# Patient Record
Sex: FEMALE | Race: WHITE | NOT HISPANIC OR LATINO | Employment: OTHER | URBAN - NONMETROPOLITAN AREA
[De-identification: names, ages, dates, MRNs, and addresses within clinical notes are randomized per-mention and may not be internally consistent; named-entity substitution may affect disease eponyms.]

---

## 2017-01-12 RX ORDER — POTASSIUM CHLORIDE 750 MG/1
TABLET, FILM COATED, EXTENDED RELEASE ORAL
Qty: 30 TABLET | Refills: 3 | Status: SHIPPED | OUTPATIENT
Start: 2017-01-12 | End: 2017-05-12 | Stop reason: SDUPTHER

## 2017-01-30 ENCOUNTER — OFFICE VISIT (OUTPATIENT)
Dept: CARDIOLOGY | Facility: CLINIC | Age: 82
End: 2017-01-30

## 2017-01-30 VITALS
HEART RATE: 64 BPM | DIASTOLIC BLOOD PRESSURE: 64 MMHG | SYSTOLIC BLOOD PRESSURE: 134 MMHG | WEIGHT: 122 LBS | BODY MASS INDEX: 22.45 KG/M2 | HEIGHT: 62 IN

## 2017-01-30 DIAGNOSIS — R01.1 MURMUR: ICD-10-CM

## 2017-01-30 DIAGNOSIS — I10 ESSENTIAL HYPERTENSION: Primary | ICD-10-CM

## 2017-01-30 DIAGNOSIS — E78.49 OTHER HYPERLIPIDEMIA: ICD-10-CM

## 2017-01-30 DIAGNOSIS — R06.02 SHORTNESS OF BREATH: ICD-10-CM

## 2017-01-30 DIAGNOSIS — I65.21 STENOSIS OF RIGHT CAROTID ARTERY: ICD-10-CM

## 2017-01-30 DIAGNOSIS — Z51.81 MEDICATION MONITORING ENCOUNTER: ICD-10-CM

## 2017-01-30 PROBLEM — E78.5 HYPERLIPEMIA: Status: ACTIVE | Noted: 2017-01-30

## 2017-01-30 PROBLEM — I65.29 CAROTID STENOSIS: Status: ACTIVE | Noted: 2017-01-30

## 2017-01-30 PROBLEM — R42 DIZZINESS: Status: ACTIVE | Noted: 2017-01-30

## 2017-01-30 PROCEDURE — 99214 OFFICE O/P EST MOD 30 MIN: CPT | Performed by: NURSE PRACTITIONER

## 2017-01-30 RX ORDER — NIFEDIPINE 30 MG/1
30 TABLET, EXTENDED RELEASE ORAL DAILY
COMMUNITY
End: 2021-02-22 | Stop reason: SDUPTHER

## 2017-01-30 NOTE — LETTER
January 30, 2017     Navarro Grace MD  [address] [place] [id number]    Patient: Kylah Pena   YOB: 1933   Date of Visit: 1/30/2017       Dear Dr. Lesly MD:    Kylah Pena was in my office today. Below are the relevant portions of my assessment and plan of care.        HR and BP are stable.  In regards to carotid stenosis, Dr. Phillips is monitoring and advised on conservative management as CTA of carotids could not be done secondary to increased creatinine and most recent carotid US did not show significantly worse narrowing.  He did advise to her to follow with neurology in regards to syncopal episodes and did advise her to discuss with you.  She will continue to follow with Dr. Phillips as well for PVD management but states he said nothing could be done for her PVD.  We did discuss to continue plavix therapy and medication management for lipid management.  Echo advised to assess for any valvular concerns or pericardial effusion that may be causing shortness of breath.  More recommendations to follow.  Labs and refills she reports with you.  6 month follow up advised or sooner if needed.      Problems Addressed this Visit        Cardiovascular and Mediastinum    HTN (hypertension) - Primary    Relevant Medications    NIFEdipine XL (PROCARDIA XL) 30 MG 24 hr tablet    Other Relevant Orders    Adult Transthoracic Echo Complete    Carotid stenosis    Relevant Orders    Adult Transthoracic Echo Complete    Hyperlipemia    Relevant Orders    Adult Transthoracic Echo Complete      Other Visit Diagnoses     Shortness of breath        Murmur        Relevant Orders    Adult Transthoracic Echo Complete              Electronically signed by EVELYN Cain January 30, 2017 11:03 AM              If you have questions, please do not hesitate to call me. I look forward to following Kylah along with you.         Sincerely,        EVELYN Cain        CC: No Recipients

## 2017-01-30 NOTE — MR AVS SNAPSHOT
Kylah Pena   1/30/2017 8:45 AM   Office Visit    Dept Phone:  [phone]   Encounter #:  [id number]    Provider:  EVELYN Cobb   Department:  [facility] CARDIOLOGY                Your Full Care Plan              Today's Medication Changes          These changes are accurate as of: 1/30/17  9:06 AM.  If you have any questions, ask your nurse or doctor.               Medication(s)that have changed:     NIFEdipine XL 30 MG 24 hr tablet   Commonly known as:  PROCARDIA XL   Take 30 mg by mouth Daily.   What changed:  Another medication with the same name was removed. Continue taking this medication, and follow the directions you see here.   Changed by:  EVELYN Cobb         Stop taking medication(s)listed here:     potassium chloride 10 MEQ CR tablet   Commonly known as:  K-DUR,KLOR-CON   Stopped by:  EVELYN Cobb                      Your Updated Medication List          This list is accurate as of: 1/30/17  9:06 AM.  Always use your most recent med list.                ALPRAZolam 0.5 MG tablet   Commonly known as:  XANAX       aspirin 81 MG EC tablet       carvedilol 3.125 MG tablet   Commonly known as:  COREG       clopidogrel 75 MG tablet   Commonly known as:  PLAVIX       furosemide 40 MG tablet   Commonly known as:  LASIX       gabapentin 300 MG capsule   Commonly known as:  NEURONTIN       hydroxychloroquine 200 MG tablet   Commonly known as:  PLAQUENIL       losartan 100 MG tablet   Commonly known as:  COZAAR   1/2 tablet by mouth daily       NIFEdipine XL 30 MG 24 hr tablet   Commonly known as:  PROCARDIA XL       nitroglycerin 0.4 MG SL tablet   Commonly known as:  NITROSTAT       nortriptyline 10 MG capsule   Commonly known as:  PAMELOR       potassium chloride 10 MEQ CR tablet   Commonly known as:  K-DUR   TAKE ONE TABLET BY MOUTH DAILY       pravastatin 40 MG tablet   Commonly known as:  PRAVACHOL       raNITIdine 150 MG tablet      Commonly known as:  ZANTAC       traMADol 50 MG tablet   Commonly known as:  ULTRAM       TRAVATAN OP               You Were Diagnosed With        Codes Comments    Essential hypertension    -  Primary ICD-10-CM: I10  ICD-9-CM: 401.9     Other hyperlipidemia     ICD-10-CM: E78.4  ICD-9-CM: 272.4     Stenosis of right carotid artery     ICD-10-CM: I65.21  ICD-9-CM: 433.10     Shortness of breath     ICD-10-CM: R06.02  ICD-9-CM: 786.05     Murmur     ICD-10-CM: R01.1  ICD-9-CM: 785.2       Instructions     None    Patient Instructions History      Upcoming Appointments     Visit Type Date Time Department    FOLLOW UP 1/30/2017  8:45 AM [facility]    FOLLOW UP 8/1/2017 12:15 PM [facility]      MyChart Signup     Our records indicate that you have declined [facility] [employer] signup. If you would like to sign up for [employer], please email [email or web address] or call [phone] to obtain an activation code.             Other Info from Your Visit           Your Appointments     Aug 01, 2017 12:15 PM EDT   Follow Up with EVELYN Clay   [facility] (--)    [address] [place] [address] [phone]           Arrive 15 minutes prior to appointment.              Allergies     No Known Allergies      Reason for Visit     Follow-up Patient did follow up with Dr. Phillips regarding dizziness. He did not want to proceed with CTA of carotids due to her age and creatnine being 2. He suggested that she see neurologist (see office note in media). Patient has not been referred to neurologist. Doesn't need refills at this time.     Chest Pain Has had a lot of pain on left side of neck. Also complains of a flushing sensation that goes up neck into face.     Shortness of Breath When she has episodes of flushing she also feels very smothered.     Palpitations Occasional.    Leg Pain Complains of a lot of pain in left leg. Leg is very discolored.       Vital  "Signs     Blood Pressure Pulse Height Weight Body Mass Index Smoking Status    134/64 64 62\" (157.5 cm) 122 lb (55.3 kg) 22.31 kg/m2 Former Smoker      Problems and Diagnoses Noted     Narrowing of artery in neck    Dizziness    High blood pressure    High cholesterol or triglycerides    Shortness of breath        Murmur            "

## 2017-02-01 ENCOUNTER — HOSPITAL ENCOUNTER (OUTPATIENT)
Dept: CARDIOLOGY | Facility: HOSPITAL | Age: 82
Discharge: HOME | End: 2017-02-01
Admitting: NURSE PRACTITIONER

## 2017-02-01 ENCOUNTER — OUTSIDE FACILITY SERVICE (OUTPATIENT)
Dept: CARDIOLOGY | Facility: CLINIC | Age: 82
End: 2017-02-01

## 2017-02-01 DIAGNOSIS — E78.49 OTHER HYPERLIPIDEMIA: ICD-10-CM

## 2017-02-01 DIAGNOSIS — I65.21 STENOSIS OF RIGHT CAROTID ARTERY: ICD-10-CM

## 2017-02-01 DIAGNOSIS — I10 ESSENTIAL HYPERTENSION: ICD-10-CM

## 2017-02-01 DIAGNOSIS — R01.1 MURMUR: ICD-10-CM

## 2017-02-01 PROCEDURE — 93306 TTE W/DOPPLER COMPLETE: CPT | Performed by: INTERNAL MEDICINE

## 2017-02-01 PROCEDURE — 93306 TTE W/DOPPLER COMPLETE: CPT

## 2017-02-08 ENCOUNTER — TELEPHONE (OUTPATIENT)
Dept: CARDIOLOGY | Facility: CLINIC | Age: 82
End: 2017-02-08

## 2017-02-08 DIAGNOSIS — I10 ESSENTIAL HYPERTENSION: ICD-10-CM

## 2017-02-08 DIAGNOSIS — Z51.81 MEDICATION MONITORING ENCOUNTER: Primary | ICD-10-CM

## 2017-02-13 RX ORDER — RANITIDINE 150 MG/1
TABLET ORAL
Qty: 60 TABLET | Refills: 6 | OUTPATIENT
Start: 2017-02-13

## 2017-05-12 RX ORDER — POTASSIUM CHLORIDE 750 MG/1
TABLET, FILM COATED, EXTENDED RELEASE ORAL
Qty: 30 TABLET | Refills: 2 | Status: SHIPPED | OUTPATIENT
Start: 2017-05-12 | End: 2017-08-10 | Stop reason: SDUPTHER

## 2017-07-21 RX ORDER — LOSARTAN POTASSIUM 100 MG/1
TABLET ORAL
Qty: 30 TABLET | Refills: 3 | Status: SHIPPED | OUTPATIENT
Start: 2017-07-21 | End: 2018-03-26 | Stop reason: SDUPTHER

## 2017-08-08 ENCOUNTER — OFFICE VISIT (OUTPATIENT)
Dept: CARDIOLOGY | Facility: CLINIC | Age: 82
End: 2017-08-08

## 2017-08-08 VITALS
HEIGHT: 61 IN | BODY MASS INDEX: 22.84 KG/M2 | WEIGHT: 121 LBS | SYSTOLIC BLOOD PRESSURE: 120 MMHG | HEART RATE: 60 BPM | DIASTOLIC BLOOD PRESSURE: 76 MMHG

## 2017-08-08 DIAGNOSIS — I73.9 PVD (PERIPHERAL VASCULAR DISEASE) WITH CLAUDICATION (HCC): Primary | ICD-10-CM

## 2017-08-08 DIAGNOSIS — I77.9 RIGHT-SIDED CAROTID ARTERY DISEASE (HCC): ICD-10-CM

## 2017-08-08 DIAGNOSIS — I34.0 NON-RHEUMATIC MITRAL REGURGITATION: ICD-10-CM

## 2017-08-08 DIAGNOSIS — R42 DIZZINESS: ICD-10-CM

## 2017-08-08 DIAGNOSIS — I10 ESSENTIAL HYPERTENSION: ICD-10-CM

## 2017-08-08 DIAGNOSIS — E78.00 HYPERCHOLESTEREMIA: ICD-10-CM

## 2017-08-08 PROCEDURE — 99213 OFFICE O/P EST LOW 20 MIN: CPT | Performed by: NURSE PRACTITIONER

## 2017-08-08 NOTE — PROGRESS NOTES
"Chief Complaint   Patient presents with   • Follow-up     6 month follow-up, labs per rheumatologist last week, routine labs per Dr. Grace, Patient reports has pain in left side of neck \"has blockage there\"   • Med Refill     request refills on zantac, patient brought medication list with visit.       Thierry Pena is a 84 y.o. female with a history of peripheral vascular disease, hypertension, and hypercholesterolemia who underwent a procedure both on the left and right legs with stenting, angioplasty, and arthrectomy by Dr. Phillips. In 2016 HCTZ was stopped due to renal function. The dose of Coreg was increased. Her blood pressure was not at goal and Procardia added. She followed with Dr. Phillips for dizziness, no CTA of carotids done due to high creatine, but he advised to see neurologist which she did not do. He advised to continue medical managment. In February 2017 a repeat echocardiogram was done and showed moderate LVH with normal LV ejection fraction, moderate MR and mild pulmonary hypertension.   Today she comes to the office for a follow up appointment and no new or worsening symptoms reported. She continues to have episodes of dizziness and will use a cane to help walk to avoid falls. She continues to have left sided neck discomfort which appears to be positional, with movement. Claudication pain of legs, specifically left leg is the same. She does report improvement of restless leg symptoms since taking gabapentin.  No chest pain, palpitations or shortness of breath reported.     HPI         Cardiac History:    Past Surgical History:   Procedure Laterality Date   • ANGIOPLASTY  03/11/2014    (R) L/E Angioplasty with stent placement (Dr. Phillips)   • ANGIOPLASTY  02/21/2014    (L) Lower Extremity Angioplasty with stent placement (Dr. Phillips)   • CARDIOVASCULAR STRESS TEST  03/31/2003    D. Myoview-(Atropine) 90% THR. Negative   • CARDIOVASCULAR STRESS TEST  06/10/2009    Stress 2 min 22 sec. " 68% THR. Negative   • CARDIOVASCULAR STRESS TEST  08/30/2010    P Myoview- Negative   • CARDIOVASCULAR STRESS TEST  09/24/2012    Stress- (Mod) 3 min 32 sec. 89% THR. 182/62. Negative   • CARDIOVASCULAR STRESS TEST  12/17/2014    Lexiscan- ([facility])- EF 65%, negative for ischemia   • CHOLECYSTECTOMY     • CONVERTED (HISTORICAL) HOLTER  12/10/2007    Holter- 58 BPM. One 3 beat SVT ( Dr. Isbell) Multiple artifact   • CONVERTED (HISTORICAL) HOLTER  07/09/2013    Holter- AVG HR 68 BPM   • CT ANGIO LWR EXTREMITY UNILATERAL W WO CONTRAST  07/26/2013    Severe Bilateral Distal Disease   • ECHO - CONVERTED  04/17/2008    Echo- EF 60%, mod MR, small rim of a pericardial effusion   • ECHO - CONVERTED  06/10/2009    Echo- EF >60%. moderate AR. RVSP- 42 mmHg   • ECHO - CONVERTED  09/24/2012    Echo- EF 65-70%, RVSP 47 mmHg   • ECHO - CONVERTED  12/17/2014    Echo- ([facility]) EF 60%, mild MR, mild pericardial effusion   • ECHO - CONVERTED  02/01/2017    EF > 65%,mod MR, RVSP 35-40 mmHg   • EP STUDY  07/28/2010    EP study with ablation of SVT. (Dr. oCchran)   • HYSTERECTOMY     • OTHER SURGICAL HISTORY  07/12/2014    WOO- 0.8 & 0.9   • US CAROTID UNILATERAL  07/21/2015    Carotid- Possible 50-69% stenosis on the right. CTA recommended, creatinine of 1.4       Current Outpatient Prescriptions   Medication Sig Dispense Refill   • ALPRAZolam (XANAX) 0.5 MG tablet Take 0.5 mg by mouth 2 (two) times a day.     • aspirin 81 MG EC tablet Take 81 mg by mouth daily.     • carvedilol (COREG) 3.125 MG tablet Take 3.125 mg by mouth 2 (two) times a day with meals.     • clopidogrel (PLAVIX) 75 MG tablet Take 75 mg by mouth daily.     • furosemide (LASIX) 40 MG tablet Take 40 mg by mouth. Take 1/2 tablet by mouth daily     • gabapentin (NEURONTIN) 300 MG capsule Take 300 mg by mouth 3 (Three) Times a Day.     • hydroxychloroquine (PLAQUENIL) 200 MG tablet Take 200 mg by mouth 2 (two) times a day.     • losartan (COZAAR) 100 MG tablet TAKE ONE-HALF  TABLET BY MOUTH DAILY 30 tablet 3   • NIFEdipine XL (PROCARDIA XL) 30 MG 24 hr tablet Take 30 mg by mouth Daily.     • nitroglycerin (NITROSTAT) 0.4 MG SL tablet Place 0.4 mg under the tongue every 5 (five) minutes as needed for chest pain (x3). Take no more than 3 doses in 15 minutes.     • nortriptyline (PAMELOR) 10 MG capsule Take 10 mg by mouth every night.     • potassium chloride (K-DUR) 10 MEQ CR tablet TAKE ONE TABLET BY MOUTH DAILY 30 tablet 2   • pravastatin (PRAVACHOL) 40 MG tablet Take 40 mg by mouth every night.     • ranitidine (ZANTAC) 150 MG tablet Take 150 mg by mouth 2 (two) times a day.     • traMADol (ULTRAM) 50 MG tablet Take 50 mg by mouth 4 (four) times a day.     • Travoprost (TRAVATAN OP) Apply  to eye.       No current facility-administered medications for this visit.        Review of patient's allergies indicates no known allergies.    Past Medical History:   Diagnosis Date   • Abnormal PFT 06/23/2009    Mild restrictive ventilatory defect and diffusion capacity mildly enlarged   • Chest x-ray abnormality 06/11/2009    Small (R) pleural effusion   • Fibromyalgia    • GERD (gastroesophageal reflux disease)    • H/O eye surgery 09/03/2008    Cataracts removed L eye.    • H/O: hysterectomy    • History of cholecystectomy    • Hypertension    • Pericardial effusion     mild   • RA (rheumatoid arthritis)    • Vasculitis    • Vasculitis        Social History     Social History   • Marital status:      Spouse name: N/A   • Number of children: N/A   • Years of education: N/A     Occupational History   • Not on file.     Social History Main Topics   • Smoking status: Former Smoker     Quit date: 1996   • Smokeless tobacco: Never Used   • Alcohol use No   • Drug use: No   • Sexual activity: Not on file     Other Topics Concern   • Not on file     Social History Narrative       Family History   Problem Relation Age of Onset   • Heart attack Sister        Review of Systems   Constitution:  "Positive for weakness (has episodes at times, not a new symptom) and malaise/fatigue (same). Negative for decreased appetite.   HENT: Negative for congestion, nosebleeds and sore throat.    Eyes: Negative for blurred vision.   Respiratory: Negative for cough, shortness of breath, sleep disturbances due to breathing and wheezing.    Endocrine: Negative for cold intolerance and heat intolerance.   Hematologic/Lymphatic: Negative for adenopathy. Does not bruise/bleed easily.   Skin: Negative for itching and skin cancer.   Musculoskeletal: Positive for arthritis, muscle cramps and myalgias (reports gabapentin has improved restless leg symptoms). Negative for falls.   Gastrointestinal: Positive for heartburn (occasional, Zantac has helped). Negative for abdominal pain, dysphagia, melena and nausea.   Genitourinary: Negative for dysuria, frequency and hematuria.   Neurological: Positive for dizziness. Negative for loss of balance, seizures and vertigo.   Psychiatric/Behavioral: Negative for altered mental status. The patient has insomnia (at times) and is nervous/anxious.    Allergic/Immunologic: Negative for environmental allergies and hives.        Diabetes- No  Thyroid-normal    Objective     /76 (BP Location: Right arm)  Pulse 60  Ht 61\" (154.9 cm)  Wt 121 lb (54.9 kg)  BMI 22.86 kg/m2    Physical Exam   Constitutional: She is oriented to person, place, and time. She appears well-nourished.   HENT:   Head: Normocephalic.   Eyes: Conjunctivae are normal. Pupils are equal, round, and reactive to light.   Neck: Normal range of motion. No JVD present. Carotid bruit is not present. No edema present.   Cardiovascular: Normal rate, regular rhythm, S1 normal and S2 normal.    Murmur heard.   Systolic murmur is present with a grade of 2/6   Pulses:       Radial pulses are 2+ on the right side, and 2+ on the left side.        Dorsalis pedis pulses are 1+ on the right side, and 1+ on the left side.   Slightly loud S2 "   Pulmonary/Chest: Effort normal and breath sounds normal. She has no wheezes. She has no rales.   Abdominal: Soft. Bowel sounds are normal. She exhibits no distension. There is no tenderness.   Musculoskeletal: Normal range of motion.   Neurological: She is alert and oriented to person, place, and time.   Skin: Skin is warm.   Psychiatric: She has a normal mood and affect.   Vitals reviewed.       Procedures          Assessment/Plan      Kylah was seen today for follow-up and med refill.    Diagnoses and all orders for this visit:    PVD (peripheral vascular disease) with claudication    Essential hypertension    Non-rheumatic mitral regurgitation    Hypercholesteremia    Dizziness    Right-sided carotid artery disease        Ms. Pena will follow with Dr. Phillips for management of PVD and carotid artery disease.   She will follow with you and rheumatologist for management of labs and with you for medication refills. She reports she was recently treated for bladder infection and will be seeing you next week for re-evaluation and symptoms have improved. Episodic dizziness also continues to be an issue. In the past a neurology consult was considered and I advised her to further discuss with you. From a cardiac standpoint she denies chest pain or palpitations. She continues to have left sided neck pain, as mentioned at her last visit. Due to renal issues I did not recommend repeat CT scan. She denies TIA symptoms of weakness or numbness on one side of body, swallowing or speech difficulties.  Her vital signs are at goal and BMI is normal. The report of her most recent echocardiogram report was reviewed. No medication changes made today. I encouraged her to continue good diet and continue activity, which she states she walks as much as she is able daily.              Electronically signed by EVELYN Khoury,  August 8, 2017 10:13 AM

## 2017-08-10 RX ORDER — POTASSIUM CHLORIDE 750 MG/1
TABLET, FILM COATED, EXTENDED RELEASE ORAL
Qty: 30 TABLET | Refills: 3 | Status: SHIPPED | OUTPATIENT
Start: 2017-08-10 | End: 2017-12-08 | Stop reason: SDUPTHER

## 2017-12-08 RX ORDER — POTASSIUM CHLORIDE 750 MG/1
TABLET, FILM COATED, EXTENDED RELEASE ORAL
Qty: 30 TABLET | Refills: 2 | Status: SHIPPED | OUTPATIENT
Start: 2017-12-08 | End: 2018-08-13 | Stop reason: ALTCHOICE

## 2018-02-12 ENCOUNTER — OFFICE VISIT (OUTPATIENT)
Dept: CARDIOLOGY | Facility: CLINIC | Age: 83
End: 2018-02-12

## 2018-02-12 ENCOUNTER — LAB (OUTPATIENT)
Dept: LAB | Facility: HOSPITAL | Age: 83
End: 2018-02-12

## 2018-02-12 VITALS
BODY MASS INDEX: 23.22 KG/M2 | SYSTOLIC BLOOD PRESSURE: 122 MMHG | HEIGHT: 61 IN | DIASTOLIC BLOOD PRESSURE: 70 MMHG | HEART RATE: 60 BPM | WEIGHT: 123 LBS

## 2018-02-12 DIAGNOSIS — I65.21 STENOSIS OF RIGHT CAROTID ARTERY: ICD-10-CM

## 2018-02-12 DIAGNOSIS — I34.0 NON-RHEUMATIC MITRAL REGURGITATION: ICD-10-CM

## 2018-02-12 DIAGNOSIS — F41.9 ANXIETY: ICD-10-CM

## 2018-02-12 DIAGNOSIS — R07.89 OTHER CHEST PAIN: ICD-10-CM

## 2018-02-12 DIAGNOSIS — I10 ESSENTIAL HYPERTENSION: ICD-10-CM

## 2018-02-12 DIAGNOSIS — I73.9 PVD (PERIPHERAL VASCULAR DISEASE) WITH CLAUDICATION (HCC): ICD-10-CM

## 2018-02-12 DIAGNOSIS — R06.02 SHORTNESS OF BREATH: ICD-10-CM

## 2018-02-12 DIAGNOSIS — E78.49 OTHER HYPERLIPIDEMIA: ICD-10-CM

## 2018-02-12 DIAGNOSIS — N28.9 RENAL INSUFFICIENCY: Primary | ICD-10-CM

## 2018-02-12 DIAGNOSIS — N28.9 RENAL INSUFFICIENCY: ICD-10-CM

## 2018-02-12 DIAGNOSIS — R42 DIZZINESS: ICD-10-CM

## 2018-02-12 PROCEDURE — 36415 COLL VENOUS BLD VENIPUNCTURE: CPT

## 2018-02-12 PROCEDURE — 80048 BASIC METABOLIC PNL TOTAL CA: CPT | Performed by: NURSE PRACTITIONER

## 2018-02-12 PROCEDURE — 99214 OFFICE O/P EST MOD 30 MIN: CPT | Performed by: NURSE PRACTITIONER

## 2018-02-12 RX ORDER — NITROGLYCERIN 0.4 MG/1
0.4 TABLET SUBLINGUAL
Qty: 30 TABLET | Refills: 1 | Status: SHIPPED | OUTPATIENT
Start: 2018-02-12 | End: 2020-08-20 | Stop reason: SDUPTHER

## 2018-02-12 RX ORDER — FUROSEMIDE 20 MG/1
20 TABLET ORAL DAILY
COMMUNITY
End: 2021-02-22 | Stop reason: SDUPTHER

## 2018-02-12 NOTE — PROGRESS NOTES
Chief Complaint   Patient presents with   • Follow-up     For cardiac management. Reports she has been having chest pains and neck pains. Reports she has been getting dizzy and can't walk at times. Reports she occasionally has palpitations. Reports she occasionally has shortness of breath when she lays down at night. Last lab work unknown, says PCP orders it.   • Med Refill     Would like new script for nitro. PCP does medication refills.        Cardiac Complaints  chest pressure/discomfort, dyspnea and Dizziness      Subjective   Kylah Pena is a 84 y.o. female with peripheral vascular disease, hypertension, and hypercholesterolemia who underwent a procedure both on the left and right legs with stenting, angioplasty, and arthrectomy by Dr. Phillips. In 2016 HCTZ was stopped due to renal function. The dose of Coreg was increased. Her blood pressure was not at goal and Procardia added. She followed with Dr. Phillips for dizziness, no CTA of carotids done due to high creatine, but he advised to see neurologist which she did not do. He advised to continue medical managment. In February 2017 a repeat echocardiogram was done and showed moderate LVH with normal LV ejection fraction, moderate MR and mild pulmonary hypertension.  She returns today for follow up and denies any new concerns.  She continues to have dizziness just as she has for several years now which often makes her feel unbalanced, according to patient she has not yet seen a neurologist. TIA symptoms denied. She does also continue to have some shortness of breath and chest pain at night when she lies down, but again reports it has been the same for sometime, she states it seems to be no worse.   Labs she says are done with PCP, she thinks it will be done again in a month.  No refills except for NTG SL needed.               Cardiac History  Past Surgical History:   Procedure Laterality Date   • ANGIOPLASTY  03/11/2014    (R) L/E Angioplasty with stent placement  (Dr. Phillips)   • ANGIOPLASTY  02/21/2014    (L) Lower Extremity Angioplasty with stent placement (Dr. Phillips)   • CARDIOVASCULAR STRESS TEST  03/31/2003    D. Myoview-(Atropine) 90% THR. Negative   • CARDIOVASCULAR STRESS TEST  06/10/2009    Stress 2 min 22 sec. 68% THR. Negative   • CARDIOVASCULAR STRESS TEST  08/30/2010    P Myoview- Negative   • CARDIOVASCULAR STRESS TEST  09/24/2012    Stress- (Mod) 3 min 32 sec. 89% THR. 182/62. Negative   • CARDIOVASCULAR STRESS TEST  12/17/2014    Lexiscan- ([facility])- EF 65%, negative for ischemia   • CHOLECYSTECTOMY     • CONVERTED (HISTORICAL) HOLTER  12/10/2007    Holter- 58 BPM. One 3 beat SVT ( Dr. Isbell) Multiple artifact   • CONVERTED (HISTORICAL) HOLTER  07/09/2013    Holter- AVG HR 68 BPM   • CT ANGIO LWR EXTREMITY UNILATERAL W WO CONTRAST  07/26/2013    Severe Bilateral Distal Disease   • ECHO - CONVERTED  04/17/2008    Echo- EF 60%, mod MR, small rim of a pericardial effusion   • ECHO - CONVERTED  06/10/2009    Echo- EF >60%. moderate AR. RVSP- 42 mmHg   • ECHO - CONVERTED  09/24/2012    Echo- EF 65-70%, RVSP 47 mmHg   • ECHO - CONVERTED  12/17/2014    Echo- ([facility]) EF 60%, mild MR, mild pericardial effusion   • ECHO - CONVERTED  02/01/2017    EF > 65%,mod MR, RVSP 35-40 mmHg   • EP STUDY  07/28/2010    EP study with ablation of SVT. (Dr. Cochran)   • HYSTERECTOMY     • OTHER SURGICAL HISTORY  07/12/2014    WOO- 0.8 & 0.9   • US CAROTID UNILATERAL  07/21/2015    Carotid- Possible 50-69% stenosis on the right. CTA recommended, creatinine of 1.4       Current Outpatient Prescriptions   Medication Sig Dispense Refill   • ALPRAZolam (XANAX) 0.5 MG tablet Take 0.5 mg by mouth 2 (two) times a day.     • aspirin 81 MG EC tablet Take 81 mg by mouth daily.     • carvedilol (COREG) 3.125 MG tablet Take 3.125 mg by mouth 2 (two) times a day with meals.     • clopidogrel (PLAVIX) 75 MG tablet Take 75 mg by mouth daily.     • furosemide (LASIX) 20 MG tablet Take 20 mg by mouth  Daily.     • gabapentin (NEURONTIN) 300 MG capsule Take 300 mg by mouth 3 (Three) Times a Day.     • hydroxychloroquine (PLAQUENIL) 200 MG tablet Take 200 mg by mouth 2 (two) times a day.     • losartan (COZAAR) 100 MG tablet TAKE ONE-HALF TABLET BY MOUTH DAILY 30 tablet 3   • NIFEdipine XL (PROCARDIA XL) 30 MG 24 hr tablet Take 30 mg by mouth Daily.     • nitroglycerin (NITROSTAT) 0.4 MG SL tablet Place 1 tablet under the tongue Every 5 (Five) Minutes As Needed for Chest Pain (x3). Take no more than 3 doses in 15 minutes. 30 tablet 1   • nortriptyline (PAMELOR) 10 MG capsule Take 10 mg by mouth every night.     • potassium chloride (K-DUR) 10 MEQ CR tablet TAKE ONE TABLET BY MOUTH DAILY 30 tablet 2   • pravastatin (PRAVACHOL) 40 MG tablet Take 40 mg by mouth every night.     • ranitidine (ZANTAC) 150 MG tablet Take 150 mg by mouth 2 (two) times a day.     • traMADol (ULTRAM) 50 MG tablet Take 50 mg by mouth 4 (four) times a day.     • Travoprost (TRAVATAN OP) Apply  to eye.       No current facility-administered medications for this visit.        Review of patient's allergies indicates no known allergies.    Past Medical History:   Diagnosis Date   • Abnormal PFT 06/23/2009    Mild restrictive ventilatory defect and diffusion capacity mildly enlarged   • Chest x-ray abnormality 06/11/2009    Small (R) pleural effusion   • Fibromyalgia    • GERD (gastroesophageal reflux disease)    • H/O eye surgery 09/03/2008    Cataracts removed L eye.    • H/O: hysterectomy    • History of cholecystectomy    • Hypertension    • Pericardial effusion     mild   • RA (rheumatoid arthritis)    • Vasculitis    • Vasculitis        Social History     Social History   • Marital status:      Spouse name: N/A   • Number of children: N/A   • Years of education: N/A     Occupational History   • Not on file.     Social History Main Topics   • Smoking status: Former Smoker     Quit date: 1996   • Smokeless tobacco: Never Used   •  "Alcohol use No   • Drug use: No   • Sexual activity: Not on file     Other Topics Concern   • Not on file     Social History Narrative       Family History   Problem Relation Age of Onset   • Heart attack Sister        Review of Systems   Constitution: Negative for weakness and malaise/fatigue.   Cardiovascular: Positive for chest pain and dyspnea on exertion. Negative for irregular heartbeat, near-syncope, palpitations and syncope.   Respiratory: Positive for shortness of breath. Negative for wheezing.    Musculoskeletal: Positive for arthritis, joint pain and joint swelling.   Gastrointestinal: Negative for anorexia, heartburn and nausea.   Genitourinary: Negative for dysuria, hesitancy and nocturia.   Neurological: Positive for dizziness and loss of balance. Negative for headaches and light-headedness.   Psychiatric/Behavioral: Negative for depression and memory loss. The patient is nervous/anxious.        DiabetesNo  Thyroidnormal    Objective     /70 (BP Location: Left arm)  Pulse 60  Ht 154.9 cm (60.98\")  Wt 55.8 kg (123 lb)  BMI 23.25 kg/m2    Physical Exam   Constitutional: She is oriented to person, place, and time. She appears well-developed and well-nourished.   HENT:   Head: Normocephalic and atraumatic.   Eyes: EOM are normal. Pupils are equal, round, and reactive to light.   Neck: Normal range of motion. Neck supple.   Cardiovascular: Normal rate and regular rhythm.    Murmur heard.  Pulmonary/Chest: Effort normal and breath sounds normal.   Abdominal: Soft.   Musculoskeletal: Normal range of motion.   Neurological: She is alert and oriented to person, place, and time.   Skin: Skin is warm and dry.   Psychiatric: She has a normal mood and affect. Her behavior is normal.       Procedures    Assessment/Plan     HR and BP are stable today.  No changes to current advised.  Blood pressure is well controlled on current regimen and she states when you will see her next month, full panel blood work " including FLP will be done to assess lipid management with pravachol.  She does continue to report dizziness which she has had for many years and does have some carotid stenosis but we have been unable to proceed with CTA of carotids as her renal function has not allowed dye testing.  Order for BMP provided today to reassess.  If renal function, creatinine is high, she would greatly benefit from neurology referral,since Dr. Phillips also felt this would be in her best interest as she continues to have dizziness/headaches but has not been able to have CTA. No new cardiac workup will be advised today as her cardiac symptoms seem to be same as before and no worsening chest pain/shortness of breath are reported. If shortness of breath/chest pain should worsen, patient advised to call for further recommendations/testing. For rheumatoid arthritis, she continues to follow with rheumatology in regards.  Refills of NTG SL sent per request.  When complete blood panel is done, can we get a copy for our records?  Good cardiac diet with limited sodium encouraged.  6 month follow up scheduled or sooner if needed.       Problems Addressed this Visit        Cardiovascular and Mediastinum    HTN (hypertension)    Relevant Medications    furosemide (LASIX) 20 MG tablet    Carotid stenosis    Hyperlipemia    Non-rheumatic mitral regurgitation    Relevant Medications    nitroglycerin (NITROSTAT) 0.4 MG SL tablet    PVD (peripheral vascular disease) with claudication       Other    Dizziness      Other Visit Diagnoses     Renal insufficiency    -  Primary    Relevant Orders    Basic Metabolic Panel    Anxiety        Shortness of breath        Other chest pain                  Electronically signed by EVELYN Cain February 12, 2018 10:23 AM

## 2018-02-13 LAB
ANION GAP SERPL CALCULATED.3IONS-SCNC: 8.8 MMOL/L (ref 3.6–11.2)
BUN BLD-MCNC: 24 MG/DL (ref 7–21)
BUN/CREAT SERPL: 17.3 (ref 7–25)
CALCIUM SPEC-SCNC: 9.5 MG/DL (ref 7.7–10)
CHLORIDE SERPL-SCNC: 103 MMOL/L (ref 99–112)
CO2 SERPL-SCNC: 29.2 MMOL/L (ref 24.3–31.9)
CREAT BLD-MCNC: 1.39 MG/DL (ref 0.43–1.29)
GFR SERPL CREATININE-BSD FRML MDRD: 36 ML/MIN/1.73
GLUCOSE BLD-MCNC: 86 MG/DL (ref 70–110)
OSMOLALITY SERPL CALC.SUM OF ELEC: 284.6 MOSM/KG (ref 273–305)
POTASSIUM BLD-SCNC: 5.1 MMOL/L (ref 3.5–5.3)
SODIUM BLD-SCNC: 141 MMOL/L (ref 135–153)

## 2018-03-07 RX ORDER — POTASSIUM CHLORIDE 750 MG/1
TABLET, FILM COATED, EXTENDED RELEASE ORAL
Qty: 30 TABLET | Refills: 1 | OUTPATIENT
Start: 2018-03-07

## 2018-03-07 NOTE — TELEPHONE ENCOUNTER
Received refill request for patient's potassium. Patient has Dx: renal insuffiency  Last labs 2/12/18 potassium 5.1. BUN 24, Creatinine 1.39. Is it okay to refill? Thank you.

## 2018-03-27 RX ORDER — LOSARTAN POTASSIUM 100 MG/1
50 TABLET ORAL DAILY
Qty: 30 TABLET | Refills: 11 | Status: SHIPPED | OUTPATIENT
Start: 2018-03-27 | End: 2018-08-13 | Stop reason: SDUPTHER

## 2018-08-13 ENCOUNTER — OFFICE VISIT (OUTPATIENT)
Dept: CARDIOLOGY | Facility: CLINIC | Age: 83
End: 2018-08-13

## 2018-08-13 VITALS
SYSTOLIC BLOOD PRESSURE: 136 MMHG | HEIGHT: 61 IN | DIASTOLIC BLOOD PRESSURE: 70 MMHG | BODY MASS INDEX: 23.03 KG/M2 | HEART RATE: 64 BPM | WEIGHT: 122 LBS

## 2018-08-13 DIAGNOSIS — I10 ESSENTIAL HYPERTENSION: Primary | ICD-10-CM

## 2018-08-13 DIAGNOSIS — I65.21 STENOSIS OF RIGHT CAROTID ARTERY: ICD-10-CM

## 2018-08-13 DIAGNOSIS — E78.00 HYPERCHOLESTEREMIA: ICD-10-CM

## 2018-08-13 DIAGNOSIS — R06.02 SHORTNESS OF BREATH: ICD-10-CM

## 2018-08-13 DIAGNOSIS — I73.9 PVD (PERIPHERAL VASCULAR DISEASE) WITH CLAUDICATION (HCC): ICD-10-CM

## 2018-08-13 DIAGNOSIS — R07.89 OTHER CHEST PAIN: ICD-10-CM

## 2018-08-13 PROCEDURE — 99213 OFFICE O/P EST LOW 20 MIN: CPT | Performed by: NURSE PRACTITIONER

## 2018-08-13 RX ORDER — LOSARTAN POTASSIUM 100 MG/1
50 TABLET ORAL DAILY
Qty: 30 TABLET | Refills: 11 | Status: SHIPPED | OUTPATIENT
Start: 2018-08-13 | End: 2019-11-21 | Stop reason: SDUPTHER

## 2018-08-13 RX ORDER — OMEPRAZOLE 20 MG/1
20 CAPSULE, DELAYED RELEASE ORAL DAILY
COMMUNITY
End: 2020-08-20 | Stop reason: ALTCHOICE

## 2018-08-13 NOTE — PROGRESS NOTES
Chief Complaint   Patient presents with   • Follow-up     For cardiac management. Patient is on aspirin. Reports she occasionally has chest pains. Reports that she feels like she is smothering.  Reports that she has been having pain in legs. Last lab work was done about 3 months ago per PCP, not in chart.    • Med Refill     Needs refills on cardiac medications. 90 day supplys to [facility].        Cardiac Complaints  chest pressure/discomfort, claudication and dyspnea      Subjective   Kylah Pena is a 85 y.o. female with peripheral vascular disease, hypertension, and hypercholesterolemia who underwent a procedure both on the left and right legs with stenting, angioplasty, and arthrectomy by Dr. Phillips. In 2016 HCTZ was stopped due to renal function. The dose of Coreg was increased. Her blood pressure was not at goal and Procardia added. She followed with Dr. Phillips for dizziness, no CTA of carotids done due to high creatine, but he advised to see neurologist which she did not do. He advised to continue medical managment. In February 2017 a repeat echocardiogram was done and showed moderate LVH with normal LV ejection fraction, moderate MR and mild pulmonary hypertension.  She comes today for follow up and denies any new cardiac concerns.  She does report some smothering at times and chest pain but relates to her acid reflux concerns. She states she was recently started on nexium therapy and states it has improved.  She does continue to have leg pains which she has for years but states neurontin is helping, no ulcers reported by patient.  She is still able to walk without concerns.  She admits to walking most days and stays active at home gardening and doing other tasks.  Labs were done about 3 months ago according to patient, no recent copy is available.  Refills of losartan requested, all others with PCP.      Cardiac History  Past Surgical History:   Procedure Laterality Date   • ANGIOPLASTY   03/11/2014    (R) L/E Angioplasty with stent placement (Dr. Phillips)   • ANGIOPLASTY  02/21/2014    (L) Lower Extremity Angioplasty with stent placement (Dr. Phillips)   • CARDIOVASCULAR STRESS TEST  03/31/2003    DChris Myoview-(Atropine) 90% THR. Negative   • CARDIOVASCULAR STRESS TEST  06/10/2009    Stress 2 min 22 sec. 68% THR. Negative   • CARDIOVASCULAR STRESS TEST  08/30/2010    P Myoview- Negative   • CARDIOVASCULAR STRESS TEST  09/24/2012    Stress- (Mod) 3 min 32 sec. 89% THR. 182/62. Negative   • CARDIOVASCULAR STRESS TEST  12/17/2014    Lexiscan- ([facility])- EF 65%, negative for ischemia   • CHOLECYSTECTOMY     • CONVERTED (HISTORICAL) HOLTER  12/10/2007    Holter- 58 BPM. One 3 beat SVT ( Dr. Isbell) Multiple artifact   • CONVERTED (HISTORICAL) HOLTER  07/09/2013    Holter- AVG HR 68 BPM   • CT ANGIO LWR EXTREMITY UNILATERAL W WO CONTRAST  07/26/2013    Severe Bilateral Distal Disease   • ECHO - CONVERTED  04/17/2008    Echo- EF 60%, mod MR, small rim of a pericardial effusion   • ECHO - CONVERTED  06/10/2009    Echo- EF >60%. moderate AR. RVSP- 42 mmHg   • ECHO - CONVERTED  09/24/2012    Echo- EF 65-70%, RVSP 47 mmHg   • ECHO - CONVERTED  12/17/2014    Echo- ([facility]) EF 60%, mild MR, mild pericardial effusion   • ECHO - CONVERTED  02/01/2017    EF > 65%,mod MR, RVSP 35-40 mmHg   • EP STUDY  07/28/2010    EP study with ablation of SVT. (Dr. Cochran)   • HYSTERECTOMY     • OTHER SURGICAL HISTORY  07/12/2014    WOO- 0.8 & 0.9   • US CAROTID UNILATERAL  07/21/2015    Carotid- Possible 50-69% stenosis on the right. CTA recommended, creatinine of 1.4       Current Outpatient Prescriptions   Medication Sig Dispense Refill   • ALPRAZolam (XANAX) 0.5 MG tablet Take 0.5 mg by mouth 2 (two) times a day.     • aspirin 81 MG EC tablet Take 81 mg by mouth daily.     • carvedilol (COREG) 3.125 MG tablet Take 3.125 mg by mouth 2 (two) times a day with meals.     • clopidogrel (PLAVIX) 75 MG tablet Take 75 mg by mouth daily.     •  furosemide (LASIX) 20 MG tablet Take 20 mg by mouth Daily.     • gabapentin (NEURONTIN) 300 MG capsule Take 300 mg by mouth 2 (Two) Times a Day.     • hydroxychloroquine (PLAQUENIL) 200 MG tablet Take 200 mg by mouth 2 (two) times a day.     • losartan (COZAAR) 100 MG tablet Take 0.5 tablets by mouth Daily. 30 tablet 11   • NIFEdipine XL (PROCARDIA XL) 30 MG 24 hr tablet Take 30 mg by mouth Daily.     • nitroglycerin (NITROSTAT) 0.4 MG SL tablet Place 1 tablet under the tongue Every 5 (Five) Minutes As Needed for Chest Pain (x3). Take no more than 3 doses in 15 minutes. 30 tablet 1   • nortriptyline (PAMELOR) 10 MG capsule Take 10 mg by mouth every night.     • omeprazole (priLOSEC) 20 MG capsule Take 20 mg by mouth Daily.     • traMADol (ULTRAM) 50 MG tablet Take 50 mg by mouth 4 (four) times a day.     • Travoprost (TRAVATAN OP) Apply  to eye.       No current facility-administered medications for this visit.        Patient has no known allergies.    Past Medical History:   Diagnosis Date   • Abnormal PFT 06/23/2009    Mild restrictive ventilatory defect and diffusion capacity mildly enlarged   • Chest x-ray abnormality 06/11/2009    Small (R) pleural effusion   • Fibromyalgia    • GERD (gastroesophageal reflux disease)    • H/O eye surgery 09/03/2008    Cataracts removed L eye.    • H/O: hysterectomy    • History of cholecystectomy    • Hypertension    • Pericardial effusion     mild   • RA (rheumatoid arthritis) (CMS/HCC)    • Vasculitis (CMS/HCC)    • Vasculitis (CMS/HCC)        Social History     Social History   • Marital status:      Spouse name: N/A   • Number of children: N/A   • Years of education: N/A     Occupational History   • Not on file.     Social History Main Topics   • Smoking status: Former Smoker     Quit date: 1996   • Smokeless tobacco: Never Used   • Alcohol use No   • Drug use: No   • Sexual activity: Not on file     Other Topics Concern   • Not on file     Social History Narrative  "  • No narrative on file       Family History   Problem Relation Age of Onset   • Heart attack Sister        Review of Systems   Constitution: Negative for weakness and malaise/fatigue.   Cardiovascular: Positive for chest pain, claudication and dyspnea on exertion. Negative for irregular heartbeat, orthopnea, palpitations and syncope.   Respiratory: Negative for cough, shortness of breath and wheezing.    Musculoskeletal: Negative for back pain and joint pain.   Gastrointestinal: Negative for anorexia, flatus, nausea and vomiting.   Genitourinary: Negative for dysuria, hematuria, hesitancy and nocturia.   Neurological: Negative for dizziness, light-headedness and loss of balance.   Psychiatric/Behavioral: Negative for depression and memory loss. The patient is not nervous/anxious.            Objective     /70 (BP Location: Left arm)   Pulse 64   Ht 154.9 cm (60.98\")   Wt 55.3 kg (122 lb)   BMI 23.06 kg/m²     Physical Exam   Constitutional: She is oriented to person, place, and time. She appears well-developed and well-nourished.   HENT:   Head: Normocephalic and atraumatic.   Eyes: Pupils are equal, round, and reactive to light. EOM are normal.   Neck: Normal range of motion. Neck supple.   Cardiovascular: Normal rate and regular rhythm.    Murmur heard.  Pulmonary/Chest: Effort normal and breath sounds normal.   Abdominal: Soft.   Musculoskeletal: Normal range of motion.   Neurological: She is alert and oriented to person, place, and time.   Skin: Skin is warm and dry.   Psychiatric: She has a normal mood and affect. Her behavior is normal.       Procedures    Assessment/Plan     HR and BP are stable today.  HTN well managed on current, no adjustment to current advised.  Patient does continue to have bilateral leg pain and we discussed changing plavix to pletal but she declines as she feels like current has her well managed and she feels like she is walking without concerns.  If walking becomes limited " or ulcers become noted to one or both BLE repeat CTA may be considered if creatinine within acceptable range.  For now, she will continue with plavix and ASA.  I do not see she is currently on pravachol therapy and she is unsure why.  Could we have most recent FLP to assess LDL, HDL, and total cholesterol since she does have significant PVD, and is not on statin for risk reduction.  She does admit that neurontin has helped with pain and tolerates it well.  Refills of cardiac meds sent per request.  BMI stable today at 23.06, continued cardiac diet and activity as tolerated advised.      Problems Addressed this Visit        Cardiovascular and Mediastinum    HTN (hypertension) - Primary    Relevant Medications    losartan (COZAAR) 100 MG tablet    Hypercholesteremia    PVD (peripheral vascular disease) with claudication (CMS/[facility])      Other Visit Diagnoses     Stenosis of right carotid artery        Shortness of breath        Other chest pain              Patient's Body mass index is 23.06 kg/m². BMI is within normal parameters. No follow-up required.          Electronically signed by EVELYN Cain August 13, 2018 4:13 PM

## 2019-02-18 ENCOUNTER — OFFICE VISIT (OUTPATIENT)
Dept: CARDIOLOGY | Facility: CLINIC | Age: 84
End: 2019-02-18

## 2019-02-18 VITALS
SYSTOLIC BLOOD PRESSURE: 114 MMHG | HEART RATE: 64 BPM | BODY MASS INDEX: 23.6 KG/M2 | DIASTOLIC BLOOD PRESSURE: 60 MMHG | HEIGHT: 61 IN | WEIGHT: 125 LBS

## 2019-02-18 DIAGNOSIS — I73.9 PVD (PERIPHERAL VASCULAR DISEASE) WITH CLAUDICATION (HCC): ICD-10-CM

## 2019-02-18 DIAGNOSIS — I10 ESSENTIAL HYPERTENSION: Primary | ICD-10-CM

## 2019-02-18 DIAGNOSIS — I65.21 STENOSIS OF RIGHT CAROTID ARTERY: ICD-10-CM

## 2019-02-18 DIAGNOSIS — I34.0 NON-RHEUMATIC MITRAL REGURGITATION: ICD-10-CM

## 2019-02-18 PROCEDURE — 99213 OFFICE O/P EST LOW 20 MIN: CPT | Performed by: NURSE PRACTITIONER

## 2019-02-18 RX ORDER — AMOXICILLIN 875 MG/1
875 TABLET, COATED ORAL 2 TIMES DAILY
COMMUNITY
End: 2019-08-20 | Stop reason: ALTCHOICE

## 2019-02-18 RX ORDER — CILOSTAZOL 50 MG/1
50 TABLET ORAL 2 TIMES DAILY
COMMUNITY
End: 2021-02-22 | Stop reason: SDUPTHER

## 2019-02-18 NOTE — PROGRESS NOTES
"Chief Complaint   Patient presents with   • Follow-up     Cardiac management. She has been having problems with esophagus, states \"having problems swallowing and choking\", is to follow with Dr Cabrera. Last labs 2-3 weeks ago per PCP. She has been having increase in pain in feet PCP started Pletal.   • Chest Pain     Having sharp pain in mid chest that radiates to mid back, has only when eating and when she lays down at night.   • Shortness of Breath     Has at night when she lays down in bed.   • Dizziness     Started having couple weeks ago, has had falls at home. Has the dizziness at random.       Thierry Pena is a 85 y.o. female with peripheral vascular disease, hypertension, and hypercholesterolemia who underwent a procedure both on the left and right legs with stenting, angioplasty, and arthrectomy by Dr. Phillips. In 2016 HCTZ was stopped due to renal function. The dose of Coreg was increased. Her blood pressure was not at goal and Procardia added. She followed with Dr. Phillips for dizziness, no CTA of carotids done due to high creatine, but he advised to see neurologist which she did not do. He advised to continue medical management. In February 2017 a repeat echocardiogram was done and showed moderate LVH with normal LV ejection fraction, moderate MR and mild pulmonary hypertension. She came today for her follow up. Her chief complaint is leg and foot pain from the PVD. She was recently started on Pletal per Dr. Grace with good benefit. She can walk from [employer] parking lot to back of store without stopping for rest. She was evaluated by Dr. Bland and according to her, no intervention recommended. She apparently underwent carotid US and was told not significant enough for surgery. She has occasional, mild chest pain that relieves with belching. She is planning to see Dr. Cabrera soon. Overall, she feels her quality of life is good for her age. Labs in March 2018 showed , Tri 63, LDL 76, " HDL 55, BUN/Cr stable at 26/1.2, K 5.4.     HPI         Cardiac History:    Past Surgical History:   Procedure Laterality Date   • ANGIOPLASTY  03/11/2014    (R) L/E Angioplasty with stent placement (Dr. Phillips)   • ANGIOPLASTY  02/21/2014    (L) Lower Extremity Angioplasty with stent placement (Dr. Phillips)   • CARDIOVASCULAR STRESS TEST  03/31/2003    D. Myoview-(Atropine) 90% THR. Negative   • CARDIOVASCULAR STRESS TEST  06/10/2009    Stress 2 min 22 sec. 68% THR. Negative   • CARDIOVASCULAR STRESS TEST  08/30/2010    P Myoview- Negative   • CARDIOVASCULAR STRESS TEST  09/24/2012    Stress- (Mod) 3 min 32 sec. 89% THR. 182/62. Negative   • CARDIOVASCULAR STRESS TEST  12/17/2014    Lexiscan- ([facility])- EF 65%, negative for ischemia   • CHOLECYSTECTOMY     • CONVERTED (HISTORICAL) HOLTER  12/10/2007    Holter- 58 BPM. One 3 beat SVT ( Dr. Isbell) Multiple artifact   • CONVERTED (HISTORICAL) HOLTER  07/09/2013    Holter- AVG HR 68 BPM   • CT ANGIO LWR EXTREMITY UNILATERAL W WO CONTRAST  07/26/2013    Severe Bilateral Distal Disease   • ECHO - CONVERTED  04/17/2008    Echo- EF 60%, mod MR, small rim of a pericardial effusion   • ECHO - CONVERTED  06/10/2009    Echo- EF >60%. moderate AR. RVSP- 42 mmHg   • ECHO - CONVERTED  09/24/2012    Echo- EF 65-70%, RVSP 47 mmHg   • ECHO - CONVERTED  12/17/2014    Echo- ([facility]) EF 60%, mild MR, mild pericardial effusion   • ECHO - CONVERTED  02/01/2017    EF > 65%,mod MR, RVSP 35-40 mmHg   • EP STUDY  07/28/2010    EP study with ablation of SVT. (Dr. Cochran)   • HYSTERECTOMY     • OTHER SURGICAL HISTORY  07/12/2014    WOO- 0.8 & 0.9   • US CAROTID UNILATERAL  07/21/2015    Carotid- Possible 50-69% stenosis on the right. CTA recommended, creatinine of 1.4       Current Outpatient Medications   Medication Sig Dispense Refill   • ALPRAZolam (XANAX) 0.5 MG tablet Take 0.5 mg by mouth 2 (two) times a day.     • amoxicillin (AMOXIL) 875 MG tablet Take 875 mg by mouth 2 (Two) Times a Day.      • aspirin 81 MG EC tablet Take 81 mg by mouth daily.     • carvedilol (COREG) 3.125 MG tablet Take 3.125 mg by mouth 2 (two) times a day with meals.     • cilostazol (PLETAL) 50 MG tablet Take 50 mg by mouth 2 (Two) Times a Day.     • clopidogrel (PLAVIX) 75 MG tablet Take 75 mg by mouth daily.     • furosemide (LASIX) 20 MG tablet Take 20 mg by mouth Daily.     • gabapentin (NEURONTIN) 300 MG capsule Take 300 mg by mouth 2 (Two) Times a Day.     • hydroxychloroquine (PLAQUENIL) 200 MG tablet Take 200 mg by mouth 2 (two) times a day.     • losartan (COZAAR) 100 MG tablet Take 0.5 tablets by mouth Daily. 30 tablet 11   • NIFEdipine XL (PROCARDIA XL) 30 MG 24 hr tablet Take 30 mg by mouth Daily.     • nitroglycerin (NITROSTAT) 0.4 MG SL tablet Place 1 tablet under the tongue Every 5 (Five) Minutes As Needed for Chest Pain (x3). Take no more than 3 doses in 15 minutes. 30 tablet 1   • nortriptyline (PAMELOR) 10 MG capsule Take 10 mg by mouth every night.     • omeprazole (priLOSEC) 20 MG capsule Take 20 mg by mouth Daily.     • traMADol (ULTRAM) 50 MG tablet Take 50 mg by mouth 4 (four) times a day.     • Travoprost (TRAVATAN OP) Apply  to eye.       No current facility-administered medications for this visit.        Patient has no known allergies.    Past Medical History:   Diagnosis Date   • Abnormal PFT 06/23/2009    Mild restrictive ventilatory defect and diffusion capacity mildly enlarged   • Chest x-ray abnormality 06/11/2009    Small (R) pleural effusion   • Fibromyalgia    • GERD (gastroesophageal reflux disease)    • H/O eye surgery 09/03/2008    Cataracts removed L eye.    • H/O: hysterectomy    • History of cholecystectomy    • Hypertension    • Pericardial effusion     mild   • RA (rheumatoid arthritis) (CMS/HCC)    • Vasculitis (CMS/HCC)    • Vasculitis (CMS/HCC)        Social History     Socioeconomic History   • Marital status:      Spouse name: Not on file   • Number of children: Not on file  "  • Years of education: Not on file   • Highest education level: Not on file   Social Needs   • Financial resource strain: Not on file   • Food insecurity - worry: Not on file   • Food insecurity - inability: Not on file   • Transportation needs - medical: Not on file   • Transportation needs - non-medical: Not on file   Occupational History   • Not on file   Tobacco Use   • Smoking status: Former Smoker     Last attempt to quit:      Years since quittin.1   • Smokeless tobacco: Never Used   Substance and Sexual Activity   • Alcohol use: No   • Drug use: No   • Sexual activity: Not on file   Other Topics Concern   • Not on file   Social History Narrative   • Not on file       Family History   Problem Relation Age of Onset   • Heart attack Sister        Review of Systems   Constitution: Negative for decreased appetite, weakness and malaise/fatigue.   HENT: Negative.    Eyes: Negative.    Cardiovascular: Positive for claudication. Negative for chest pain, dyspnea on exertion, leg swelling, orthopnea, palpitations and syncope.   Respiratory: Negative for cough and shortness of breath.    Endocrine: Negative.    Hematologic/Lymphatic: Negative.    Skin: Negative.    Musculoskeletal: Positive for falls (using walker for assistance at home ).   Gastrointestinal: Positive for flatus (belching ) and heartburn. Negative for abdominal pain and melena.   Genitourinary: Negative for dysuria and hematuria.   Neurological: Negative for dizziness (very mild, occasionally ).   Psychiatric/Behavioral: Negative for altered mental status and depression.   Allergic/Immunologic: Negative.         Diabetes- No  Thyroid-normal    Objective     /60 (BP Location: Left arm)   Pulse 64   Ht 154.9 cm (60.98\")   Wt 56.7 kg (125 lb)   BMI 23.63 kg/m²     Physical Exam   Constitutional: She is oriented to person, place, and time. She appears well-developed and well-nourished. No distress.   HENT:   Head: Normocephalic.   Eyes: " Pupils are equal, round, and reactive to light.   Neck: Normal range of motion.   Cardiovascular: Normal rate and regular rhythm. Exam reveals decreased pulses.   Murmur heard.   Systolic murmur is present with a grade of 2/6 at the apex.  Pulses:       Dorsalis pedis pulses are 1+ on the right side, and 2+ on the left side.   Pulmonary/Chest: Effort normal and breath sounds normal. No respiratory distress. She has no wheezes. She has no rales.   Abdominal: Soft. Bowel sounds are normal. She exhibits no distension.   Musculoskeletal: Normal range of motion. She exhibits no edema.   Neurological: She is alert and oriented to person, place, and time.   Skin: Skin is warm and dry. She is not diaphoretic.   Psychiatric: She has a normal mood and affect.   Nursing note and vitals reviewed.     Procedures          Assessment/Plan    Heart rate and blood pressure are well controlled. Agree with adding Pletal. Advised to develop a regular walking regimen, at least ten minutes twice daily to improve peripheral flow. Left foot pulse is strong, but a little weaker on right. Her last stress test was in 2014 but she feels the chest discomfort is related to gas and prefers to continue conservative management. Echo in 2017 showed moderate MR with stable EF.  No new testing ordered today. Her cholesterol is well controlled with LDL 76 in March 2018 with diet alone. She does not appear to be taking statin. Labs are monitored with Dr. Grace. She does have PAD and carotid disease. Will leave this to his discretion. BMI is normal. She follows heart healthy diet. She appears stable at this time. We will see her back in six months or sooner if needed.   Kylah was seen today for follow-up, chest pain, shortness of breath and dizziness.    Diagnoses and all orders for this visit:    Essential hypertension    PVD (peripheral vascular disease) with claudication (CMS/[facility])    Stenosis of right carotid artery    Non-rheumatic mitral  regurgitation        Patient's Body mass index is 23.63 kg/m². BMI is within normal parameters. No follow-up required..                     Electronically signed by EVELYN Lira,  February 18, 2019 11:40 AM

## 2019-08-20 ENCOUNTER — OFFICE VISIT (OUTPATIENT)
Dept: CARDIOLOGY | Facility: CLINIC | Age: 84
End: 2019-08-20

## 2019-08-20 VITALS
WEIGHT: 121.4 LBS | HEIGHT: 61 IN | SYSTOLIC BLOOD PRESSURE: 118 MMHG | HEART RATE: 68 BPM | DIASTOLIC BLOOD PRESSURE: 70 MMHG | BODY MASS INDEX: 22.92 KG/M2

## 2019-08-20 DIAGNOSIS — E78.00 HYPERCHOLESTEREMIA: ICD-10-CM

## 2019-08-20 DIAGNOSIS — I73.9 PVD (PERIPHERAL VASCULAR DISEASE) WITH CLAUDICATION (HCC): Primary | ICD-10-CM

## 2019-08-20 DIAGNOSIS — R13.10 DYSPHAGIA, UNSPECIFIED TYPE: ICD-10-CM

## 2019-08-20 DIAGNOSIS — I10 ESSENTIAL HYPERTENSION: ICD-10-CM

## 2019-08-20 DIAGNOSIS — I34.0 NON-RHEUMATIC MITRAL REGURGITATION: ICD-10-CM

## 2019-08-20 DIAGNOSIS — I65.21 STENOSIS OF RIGHT CAROTID ARTERY: ICD-10-CM

## 2019-08-20 PROCEDURE — 99213 OFFICE O/P EST LOW 20 MIN: CPT | Performed by: NURSE PRACTITIONER

## 2019-08-20 NOTE — PROGRESS NOTES
Chief Complaint   Patient presents with   • Follow-up     Cardiac management .Takes Aspirin daily . No current labs on chart  . Has no cardiac complaints today   • Med Refill     PCP manages refills . Reviewed medications verbally.       Thierry Pena is a 86 y.o. female with peripheral vascular disease, hypertension, and hypercholesterolemia who underwent a procedure both on the left and right legs with stenting, angioplasty, and arthrectomy by Dr. Phillips. In 2016 HCTZ was stopped due to renal function. The dose of Coreg was increased. Her blood pressure was not at goal and Procardia added. She followed with Dr. Phillpis for dizziness, no CTA of carotids done due to high creatine, but he advised to see neurologist which she did not do. He advised to continue medical management. In February 2017 a repeat echocardiogram was done and showed moderate LVH with normal LV ejection fraction, moderate MR and mild pulmonary hypertension. At February 2019 visit, she reported being started on Pletal per Dr. Grace with good benefit. She was able to walk from [employer] parking lot to back of store without stopping for rest. Her PVD was also evaluated by Dr. Bland and according to her, no intervention recommended. She apparently underwent carotid US and was told not significant enough for surgery.    Today     HPI     Cardiac History:    Past Surgical History:   Procedure Laterality Date   • ANGIOPLASTY  03/11/2014    (R) L/E Angioplasty with stent placement (Dr. Phillips)   • ANGIOPLASTY  02/21/2014    (L) Lower Extremity Angioplasty with stent placement (Dr. Phillips)   • CARDIOVASCULAR STRESS TEST  03/31/2003    D. Myoview-(Atropine) 90% THR. Negative   • CARDIOVASCULAR STRESS TEST  06/10/2009    Stress 2 min 22 sec. 68% THR. Negative   • CARDIOVASCULAR STRESS TEST  08/30/2010    P Myoview- Negative   • CARDIOVASCULAR STRESS TEST  09/24/2012    Stress- (Mod) 3 min 32 sec. 89% THR. 182/62. Negative   • CARDIOVASCULAR  STRESS TEST  12/17/2014    Lexiscan- ([facility])- EF 65%, negative for ischemia   • CHOLECYSTECTOMY     • CONVERTED (HISTORICAL) HOLTER  12/10/2007    Holter- 58 BPM. One 3 beat SVT ( Dr. Isbell) Multiple artifact   • CONVERTED (HISTORICAL) HOLTER  07/09/2013    Holter- AVG HR 68 BPM   • CT ANGIO LWR EXTREMITY UNILATERAL W WO CONTRAST  07/26/2013    Severe Bilateral Distal Disease   • ECHO - CONVERTED  04/17/2008    Echo- EF 60%, mod MR, small rim of a pericardial effusion   • ECHO - CONVERTED  06/10/2009    Echo- EF >60%. moderate AR. RVSP- 42 mmHg   • ECHO - CONVERTED  09/24/2012    Echo- EF 65-70%, RVSP 47 mmHg   • ECHO - CONVERTED  12/17/2014    Echo- ([facility]) EF 60%, mild MR, mild pericardial effusion   • ECHO - CONVERTED  02/01/2017    EF > 65%,mod MR, RVSP 35-40 mmHg   • EP STUDY  07/28/2010    EP study with ablation of SVT. (Dr. Cochran)   • HYSTERECTOMY     • OTHER SURGICAL HISTORY  07/12/2014    WOO- 0.8 & 0.9   • US CAROTID UNILATERAL  07/21/2015    Carotid- Possible 50-69% stenosis on the right. CTA recommended, creatinine of 1.4       Current Outpatient Medications   Medication Sig Dispense Refill   • ALPRAZolam (XANAX) 0.5 MG tablet Take 0.5 mg by mouth 2 (two) times a day.     • aspirin 81 MG EC tablet Take 81 mg by mouth daily.     • carvedilol (COREG) 3.125 MG tablet Take 3.125 mg by mouth 2 (two) times a day with meals.     • cilostazol (PLETAL) 50 MG tablet Take 50 mg by mouth 2 (Two) Times a Day.     • clopidogrel (PLAVIX) 75 MG tablet Take 75 mg by mouth daily.     • furosemide (LASIX) 20 MG tablet Take 20 mg by mouth Daily.     • gabapentin (NEURONTIN) 300 MG capsule Take 300 mg by mouth 2 (Two) Times a Day.     • hydroxychloroquine (PLAQUENIL) 200 MG tablet Take 200 mg by mouth 2 (two) times a day.     • losartan (COZAAR) 100 MG tablet Take 0.5 tablets by mouth Daily. 30 tablet 11   • NIFEdipine XL (PROCARDIA XL) 30 MG 24 hr tablet Take 30 mg by mouth Daily.     • nitroglycerin (NITROSTAT) 0.4 MG SL  tablet Place 1 tablet under the tongue Every 5 (Five) Minutes As Needed for Chest Pain (x3). Take no more than 3 doses in 15 minutes. 30 tablet 1   • nortriptyline (PAMELOR) 10 MG capsule Take 10 mg by mouth every night.     • omeprazole (priLOSEC) 20 MG capsule Take 20 mg by mouth Daily.     • traMADol (ULTRAM) 50 MG tablet Take 50 mg by mouth 4 (four) times a day.     • Travoprost (TRAVATAN OP) Apply  to eye.       No current facility-administered medications for this visit.        Patient has no known allergies.    Past Medical History:   Diagnosis Date   • Abnormal PFT 2009    Mild restrictive ventilatory defect and diffusion capacity mildly enlarged   • Chest x-ray abnormality 2009    Small (R) pleural effusion   • Fibromyalgia    • GERD (gastroesophageal reflux disease)    • H/O eye surgery 2008    Cataracts removed L eye.    • H/O: hysterectomy    • History of cholecystectomy    • Hypertension    • Pericardial effusion     mild   • RA (rheumatoid arthritis) (CMS/HCC)    • Vasculitis (CMS/HCC)    • Vasculitis (CMS/HCC)        Social History     Socioeconomic History   • Marital status:      Spouse name: Not on file   • Number of children: Not on file   • Years of education: Not on file   • Highest education level: Not on file   Tobacco Use   • Smoking status: Former Smoker     Last attempt to quit:      Years since quittin.6   • Smokeless tobacco: Never Used   Substance and Sexual Activity   • Alcohol use: No   • Drug use: No       Family History   Problem Relation Age of Onset   • Heart attack Sister        Review of Systems   Constitution: Negative for decreased appetite and weakness.   HENT: Negative for congestion, hoarse voice and nosebleeds.    Eyes: Negative for visual disturbance.   Cardiovascular: Positive for claudication. Negative for chest pain, near-syncope and palpitations.   Respiratory: Negative for cough and shortness of breath.    Hematologic/Lymphatic:  "Negative for bleeding problem. Does not bruise/bleed easily.   Skin: Positive for color change (red discoloration of toes, not a new problem). Negative for suspicious lesions.   Musculoskeletal: Positive for joint pain (toes/feet, not a new problem) and muscle cramps (and burning at night, not a new problem). Negative for falls.   Gastrointestinal: Positive for bloating, dysphagia (to follow up with Dr. Goodman), flatus and heartburn. Negative for abdominal pain, melena and nausea.   Genitourinary: Negative for dysuria and hematuria.   Neurological: Positive for numbness. Negative for dizziness, loss of balance and paresthesias.   Psychiatric/Behavioral: Negative for memory loss. The patient is nervous/anxious.    Allergic/Immunologic: Negative.         Objective     /70 (BP Location: Right arm)   Pulse 68   Ht 154.9 cm (60.98\")   Wt 55.1 kg (121 lb 6.4 oz)   BMI 22.95 kg/m²     Physical Exam   Constitutional: She is oriented to person, place, and time. She appears well-nourished.   HENT:   Head: Normocephalic.   Eyes: Pupils are equal, round, and reactive to light.   Neck: Normal range of motion. Carotid bruit is present.   Cardiovascular: Normal rate, regular rhythm, S1 normal and S2 normal.   Murmur heard.   Systolic murmur is present with a grade of 2/6.  Pulses:       Radial pulses are 2+ on the right side, and 2+ on the left side.        Dorsalis pedis pulses are 1+ on the right side, and 1+ on the left side.        Posterior tibial pulses are 1+ on the right side, and 1+ on the left side.   Pulmonary/Chest: Breath sounds normal.   Abdominal: Soft. Bowel sounds are normal.   Musculoskeletal: Normal range of motion.   Neurological: She is alert and oriented to person, place, and time.   Skin: Skin is warm.   Psychiatric: She has a normal mood and affect.        Procedures: none today         Assessment/Plan      Kylah was seen today for follow-up and med refill.    Diagnoses and all orders for this " visit:    PVD (peripheral vascular disease) with claudication (CMS/HCC)    Stenosis of right carotid artery    Essential hypertension    Hypercholesteremia    Non-rheumatic mitral regurgitation    Dysphagia, unspecified type      Carotid artery disease and PVD - most recently followed by Dr. Bland. She will discuss referral to vascular surgeon with you.  She maintains a walking regimen without worsening symptoms.  Her pedal pulses are palpable.  I encouraged her to continue Pletal and walking regimen.  She is taking gabapentin which does help with neuropathy.  She is also on Plavix and aspirin therapy without signs of bleeding or excessive bruising.  Advised to continue the same.    She admits to recurrence of difficulty swallowing.  Earlier this year she underwent esophageal stretching by Dr. Cabrera and symptoms had improved.  I advised her to follow-up with Dr. Grant due to recurrence of symptoms.    Patient's Body mass index is 22.95 kg/m². BMI is within normal parameters. No follow-up required.     Her blood pressure, heart rate, and heart rhythm today are normal.  From a cardiac standpoint she denies concerns or symptoms.  No repeat cardiac testing ordered today.  The same dose of Procardia, Coreg and Cozaar advised.  She also takes daily Lasix.  No medication changes made.  She follows with you for refills.  She also follows with you for lab orders.  Please put a copy of lab results as available.    We will see her back in 6 months.  Please call sooner for cardiac concerns.            Electronically signed by EVELYN Khoury,  August 20, 2019 12:28 PM

## 2019-11-21 RX ORDER — LOSARTAN POTASSIUM 100 MG/1
50 TABLET ORAL DAILY
Qty: 90 TABLET | Refills: 3 | Status: SHIPPED | OUTPATIENT
Start: 2019-11-21 | End: 2020-08-20 | Stop reason: SDUPTHER

## 2020-02-18 ENCOUNTER — OFFICE VISIT (OUTPATIENT)
Dept: CARDIOLOGY | Facility: CLINIC | Age: 85
End: 2020-02-18

## 2020-02-18 VITALS
SYSTOLIC BLOOD PRESSURE: 124 MMHG | HEART RATE: 68 BPM | HEIGHT: 61 IN | WEIGHT: 120 LBS | DIASTOLIC BLOOD PRESSURE: 70 MMHG | BODY MASS INDEX: 22.66 KG/M2

## 2020-02-18 DIAGNOSIS — E78.00 HYPERCHOLESTEREMIA: ICD-10-CM

## 2020-02-18 DIAGNOSIS — I73.9 PVD (PERIPHERAL VASCULAR DISEASE) WITH CLAUDICATION (HCC): ICD-10-CM

## 2020-02-18 DIAGNOSIS — R06.02 SHORTNESS OF BREATH: ICD-10-CM

## 2020-02-18 DIAGNOSIS — I10 ESSENTIAL HYPERTENSION: ICD-10-CM

## 2020-02-18 DIAGNOSIS — F41.9 ANXIETY: ICD-10-CM

## 2020-02-18 DIAGNOSIS — I65.21 STENOSIS OF RIGHT CAROTID ARTERY: ICD-10-CM

## 2020-02-18 DIAGNOSIS — R51.9 HEADACHE DISORDER: Primary | ICD-10-CM

## 2020-02-18 DIAGNOSIS — R42 DIZZINESS: ICD-10-CM

## 2020-02-18 DIAGNOSIS — R00.2 PALPITATIONS: ICD-10-CM

## 2020-02-18 PROCEDURE — 99214 OFFICE O/P EST MOD 30 MIN: CPT | Performed by: NURSE PRACTITIONER

## 2020-02-18 NOTE — PROGRESS NOTES
Chief Complaint   Patient presents with   • Follow-up     For cardiac management. Patient is on aspirin. States that she has been having headaches a lot and pain in her neck, feels like she can't turn her head. States that she occasionally gets short of breath. States that she occasionally has palpitations. Last lab work was done a few months ago per PCP, not in chart.    • Med Refill     PCP does medication refills. Brought medication list with visit.        Cardiac Complaints  Dizziness      Subjective   Kylah Pena is a 86 y.o. female with peripheral vascular disease, murmur, hypertension, palpitations, and hypercholesterolemia who underwent a procedure both on the left and right legs with stenting, angioplasty, and arthrectomy by Dr. Phillips. In 2016 HCTZ was stopped due to renal function. The dose of Coreg was increased. Her blood pressure was not at goal and Procardia added. She followed with Dr. Phillips for dizziness, no CTA of carotids done due to high creatine, but he advised to see neurologist which she did not do. He advised to continue medical management. In February 2017 a repeat echocardiogram was done and showed moderate LVH with normal LV ejection fraction, moderate MR and mild pulmonary hypertension. At February 2019 visit, she reported being started on Pletal per Dr. Grace with good benefit. She was able to walk from [employer] parking lot to back of store without stopping for rest. Her PVD was also evaluated by Dr. Bland and according to her, no intervention recommended.    Patient returns today for follow up and admits to a great deal of pain in her neck, dizziness, and stiffness. She states that sometimes her neck becomes very tight and she feels as though she can not turn it. Acute injury is denied. She does have both SOA and palpitations on occasion.  She states neither are bothersome and do not interfere with everyday activity. She does admit to claudication pain but states it is much improved  from prior with the addition of pletal. Chest pain denied. Labs followed by PCP. No current available, but were last checked by PCP a few months ago. No refills currently needed. Patient does report a great deal of stress as she lost her son back in the fall and she has been grieving in regards.                 Cardiac History  Past Surgical History:   Procedure Laterality Date   • ANGIOPLASTY  03/11/2014    (R) L/E Angioplasty with stent placement (Dr. Phillips)   • ANGIOPLASTY  02/21/2014    (L) Lower Extremity Angioplasty with stent placement (Dr. Phillips)   • CARDIOVASCULAR STRESS TEST  03/31/2003    D. Myoview-(Atropine) 90% THR. Negative   • CARDIOVASCULAR STRESS TEST  06/10/2009    Stress 2 min 22 sec. 68% THR. Negative   • CARDIOVASCULAR STRESS TEST  08/30/2010    P Myoview- Negative   • CARDIOVASCULAR STRESS TEST  09/24/2012    Stress- (Mod) 3 min 32 sec. 89% THR. 182/62. Negative   • CARDIOVASCULAR STRESS TEST  12/17/2014    Lexiscan- ([facility])- EF 65%, negative for ischemia   • CHOLECYSTECTOMY     • CONVERTED (HISTORICAL) HOLTER  12/10/2007    Holter- 58 BPM. One 3 beat SVT ( Dr. Isbell) Multiple artifact   • CONVERTED (HISTORICAL) HOLTER  07/09/2013    Holter- AVG HR 68 BPM   • CT ANGIO LWR EXTREMITY UNILATERAL W WO CONTRAST  07/26/2013    Severe Bilateral Distal Disease   • ECHO - CONVERTED  04/17/2008    Echo- EF 60%, mod MR, small rim of a pericardial effusion   • ECHO - CONVERTED  06/10/2009    Echo- EF >60%. moderate AR. RVSP- 42 mmHg   • ECHO - CONVERTED  09/24/2012    Echo- EF 65-70%, RVSP 47 mmHg   • ECHO - CONVERTED  12/17/2014    Echo- ([facility]) EF 60%, mild MR, mild pericardial effusion   • ECHO - CONVERTED  02/01/2017    EF > 65%,mod MR, RVSP 35-40 mmHg   • EP STUDY  07/28/2010    EP study with ablation of SVT. (Dr. Cochran)   • HYSTERECTOMY     • OTHER SURGICAL HISTORY  07/12/2014    WOO- 0.8 & 0.9   • US CAROTID UNILATERAL  07/21/2015    Carotid- Possible 50-69% stenosis on the right. CTA recommended,  creatinine of 1.4       Current Outpatient Medications   Medication Sig Dispense Refill   • ALPRAZolam (XANAX) 0.5 MG tablet Take 0.5 mg by mouth 2 (two) times a day.     • aspirin 81 MG EC tablet Take 81 mg by mouth daily.     • carvedilol (COREG) 3.125 MG tablet Take 3.125 mg by mouth 2 (two) times a day with meals.     • cilostazol (PLETAL) 50 MG tablet Take 50 mg by mouth 2 (Two) Times a Day.     • clopidogrel (PLAVIX) 75 MG tablet Take 75 mg by mouth daily.     • furosemide (LASIX) 20 MG tablet Take 20 mg by mouth Daily.     • gabapentin (NEURONTIN) 300 MG capsule Take 300 mg by mouth 2 (Two) Times a Day.     • hydroxychloroquine (PLAQUENIL) 200 MG tablet Take 200 mg by mouth 2 (two) times a day.     • losartan (COZAAR) 100 MG tablet Take 0.5 tablets by mouth Daily. 90 tablet 3   • NIFEdipine XL (PROCARDIA XL) 30 MG 24 hr tablet Take 30 mg by mouth Daily.     • nitroglycerin (NITROSTAT) 0.4 MG SL tablet Place 1 tablet under the tongue Every 5 (Five) Minutes As Needed for Chest Pain (x3). Take no more than 3 doses in 15 minutes. 30 tablet 1   • nortriptyline (PAMELOR) 10 MG capsule Take 10 mg by mouth every night.     • omeprazole (priLOSEC) 20 MG capsule Take 20 mg by mouth Daily.     • traMADol (ULTRAM) 50 MG tablet Take 50 mg by mouth 4 (four) times a day.     • Travoprost (TRAVATAN OP) Apply  to eye.       No current facility-administered medications for this visit.        Patient has no known allergies.    Past Medical History:   Diagnosis Date   • Abnormal PFT 06/23/2009    Mild restrictive ventilatory defect and diffusion capacity mildly enlarged   • Chest x-ray abnormality 06/11/2009    Small (R) pleural effusion   • Fibromyalgia    • GERD (gastroesophageal reflux disease)    • H/O eye surgery 09/03/2008    Cataracts removed L eye.    • H/O: hysterectomy    • History of cholecystectomy    • Hypertension    • Pericardial effusion     mild   • RA (rheumatoid arthritis) (CMS/HCC)    • Vasculitis (CMS/HCC)    "  • Vasculitis (CMS/[facility])        Social History     Socioeconomic History   • Marital status:      Spouse name: Not on file   • Number of children: Not on file   • Years of education: Not on file   • Highest education level: Not on file   Tobacco Use   • Smoking status: Former Smoker     Last attempt to quit:      Years since quittin.1   • Smokeless tobacco: Never Used   Substance and Sexual Activity   • Alcohol use: No   • Drug use: No       Family History   Problem Relation Age of Onset   • Heart attack Sister        Review of Systems   Constitution: Negative for malaise/fatigue and night sweats.   Cardiovascular: Positive for claudication and palpitations. Negative for chest pain, dyspnea on exertion, irregular heartbeat, leg swelling, near-syncope and syncope.   Respiratory: Positive for shortness of breath. Negative for cough and wheezing.    Musculoskeletal: Positive for joint pain and stiffness.   Gastrointestinal: Negative for anorexia, heartburn, melena, nausea and vomiting.   Genitourinary: Negative for dysuria, hematuria, hesitancy and nocturia.   Neurological: Positive for dizziness and light-headedness.   Psychiatric/Behavioral: Positive for depression. Negative for memory loss.           Objective     /70 (BP Location: Left arm)   Pulse 68   Ht 154.9 cm (60.98\")   Wt 54.4 kg (120 lb)   BMI 22.69 kg/m²     Physical Exam   Constitutional: She is oriented to person, place, and time. She appears well-developed and well-nourished.   HENT:   Head: Normocephalic and atraumatic.   Eyes: Pupils are equal, round, and reactive to light. EOM are normal.   Neck: Normal range of motion. Neck supple.   Right carotid bruit   Cardiovascular: Normal rate and regular rhythm.   Murmur heard.  Pulmonary/Chest: Effort normal and breath sounds normal.   Abdominal: Soft.   Musculoskeletal: Normal range of motion.   Neurological: She is alert and oriented to person, place, and time.   Skin: Skin is " warm and dry.   Psychiatric: She has a normal mood and affect. Her behavior is normal.       Procedures    Assessment/Plan     SOA/headache:  Echo recommended to assess LV function and valvular areas since more symptoms reported. Special attention to be paid for any WMA as well as she does admit to grieving after the loss of her son. Bubble study also advised to rule out any shunt as headaches are reported.  More recommendations to follow.    HTN: Blood pressure is well managed. No adjustment to current coreg, cozaar, or procardia therapy advised. Limited sodium intake encouraged.     Headache/dizziness:  Carotid US advised to assess for any worsening carotid stenosis. More recommendations to follow.    PVD:  Remains on pletal, ASA and plavix therapy.  Bleeding and bruising denied. Claudication pain reported but patient admits it is improved with use of pletal therapy, current continued.    Palpitations:  Reported, but rare. No adjustment to current coreg therapy advised. Limited caffeine intake encouraged and adequate hydration advised.    Hyperlipidemia:  Managing with diet alone. Patient reports FLP monitored by your office. Can we have recent for review?     BMI noted at 22.69.  Patient admits to not eating as much since the death of her son. Adequate protein intake as well as adequate caloric intake encouraged.     Condolences given on the death of her son.    6 month follow up recommended or sooner if needed.        Problems Addressed this Visit        Cardiovascular and Mediastinum    HTN (hypertension)    Carotid stenosis    Hypercholesteremia    PVD (peripheral vascular disease) with claudication (CMS/HCC)       Other    Dizziness    Relevant Orders    Adult Transthoracic Echo Complete W/ Cont if Necessary Per Protocol    US Carotid Bilateral      Other Visit Diagnoses     Headache disorder    -  Primary    Relevant Orders    Adult Transthoracic Echo Complete W/ Cont if Necessary Per Protocol    US Carotid  Bilateral    Shortness of breath        Relevant Orders    Adult Transthoracic Echo Complete W/ Cont if Necessary Per Protocol    US Carotid Bilateral    Palpitations        Anxiety              Patient's Body mass index is 22.69 kg/m². BMI is within normal parameters. No follow-up required..              Electronically signed by EVELYN Cain February 18, 2020 @NOW@

## 2020-02-24 ENCOUNTER — HOSPITAL ENCOUNTER (OUTPATIENT)
Dept: CARDIOLOGY | Facility: HOSPITAL | Age: 85
Discharge: HOME | End: 2020-02-24

## 2020-02-24 ENCOUNTER — HOSPITAL ENCOUNTER (OUTPATIENT)
Dept: CARDIOLOGY | Facility: HOSPITAL | Age: 85
Discharge: HOME | End: 2020-02-24
Admitting: NURSE PRACTITIONER

## 2020-02-24 DIAGNOSIS — R42 DIZZINESS: ICD-10-CM

## 2020-02-24 DIAGNOSIS — R06.02 SHORTNESS OF BREATH: ICD-10-CM

## 2020-02-24 DIAGNOSIS — R51.9 HEADACHE DISORDER: ICD-10-CM

## 2020-02-24 LAB
BH CV ECHO MEAS - ACS: 1.6 CM
BH CV ECHO MEAS - AO MAX PG: 6.9 MMHG
BH CV ECHO MEAS - AO MEAN PG: 4 MMHG
BH CV ECHO MEAS - AO ROOT AREA (BSA CORRECTED): 2
BH CV ECHO MEAS - AO ROOT AREA: 7.3 CM^2
BH CV ECHO MEAS - AO ROOT DIAM: 3.1 CM
BH CV ECHO MEAS - AO V2 MAX: 131 CM/SEC
BH CV ECHO MEAS - AO V2 MEAN: 100 CM/SEC
BH CV ECHO MEAS - AO V2 VTI: 28.5 CM
BH CV ECHO MEAS - BSA(HAYCOCK): 1.5 M^2
BH CV ECHO MEAS - BSA: 1.5 M^2
BH CV ECHO MEAS - BZI_BMI: 23.4 KILOGRAMS/M^2
BH CV ECHO MEAS - BZI_METRIC_HEIGHT: 152.4 CM
BH CV ECHO MEAS - BZI_METRIC_WEIGHT: 54.4 KG
BH CV ECHO MEAS - EDV(CUBED): 13.5 ML
BH CV ECHO MEAS - EDV(MOD-SP4): 45.1 ML
BH CV ECHO MEAS - EDV(TEICH): 19.7 ML
BH CV ECHO MEAS - EF(CUBED): 85.9 %
BH CV ECHO MEAS - EF(MOD-SP4): 53.7 %
BH CV ECHO MEAS - EF(TEICH): 81.4 %
BH CV ECHO MEAS - ESV(CUBED): 1.9 ML
BH CV ECHO MEAS - ESV(MOD-SP4): 20.9 ML
BH CV ECHO MEAS - ESV(TEICH): 3.7 ML
BH CV ECHO MEAS - FS: 47.9 %
BH CV ECHO MEAS - IVS/LVPW: 0.67
BH CV ECHO MEAS - IVSD: 1.3 CM
BH CV ECHO MEAS - LA DIMENSION: 3.1 CM
BH CV ECHO MEAS - LA/AO: 1
BH CV ECHO MEAS - LV DIASTOLIC VOL/BSA (35-75): 30 ML/M^2
BH CV ECHO MEAS - LV IVRT: 0.1 SEC
BH CV ECHO MEAS - LV MASS(C)D: 136.3 GRAMS
BH CV ECHO MEAS - LV MASS(C)DI: 90.7 GRAMS/M^2
BH CV ECHO MEAS - LV SYSTOLIC VOL/BSA (12-30): 13.9 ML/M^2
BH CV ECHO MEAS - LVIDD: 2.4 CM
BH CV ECHO MEAS - LVIDS: 1.2 CM
BH CV ECHO MEAS - LVLD AP4: 6.5 CM
BH CV ECHO MEAS - LVLS AP4: 5.4 CM
BH CV ECHO MEAS - LVOT AREA (M): 3.1 CM^2
BH CV ECHO MEAS - LVOT AREA: 3.1 CM^2
BH CV ECHO MEAS - LVOT DIAM: 2 CM
BH CV ECHO MEAS - LVPWD: 1.9 CM
BH CV ECHO MEAS - MV A MAX VEL: 163 CM/SEC
BH CV ECHO MEAS - MV DEC SLOPE: 333 CM/SEC^2
BH CV ECHO MEAS - MV E MAX VEL: 94.3 CM/SEC
BH CV ECHO MEAS - MV E/A: 0.58
BH CV ECHO MEAS - MV MAX PG: 10.9 MMHG
BH CV ECHO MEAS - MV MEAN PG: 4 MMHG
BH CV ECHO MEAS - MV P1/2T MAX VEL: 88.2 CM/SEC
BH CV ECHO MEAS - MV P1/2T: 77.6 MSEC
BH CV ECHO MEAS - MV V2 MAX: 165 CM/SEC
BH CV ECHO MEAS - MV V2 MEAN: 87.9 CM/SEC
BH CV ECHO MEAS - MV V2 VTI: 33 CM
BH CV ECHO MEAS - MVA P1/2T LCG: 2.5 CM^2
BH CV ECHO MEAS - MVA(P1/2T): 2.8 CM^2
BH CV ECHO MEAS - RAP SYSTOLE: 10 MMHG
BH CV ECHO MEAS - RVDD: 2.4 CM
BH CV ECHO MEAS - RVSP: 44.3 MMHG
BH CV ECHO MEAS - SI(AO): 138.6 ML/M^2
BH CV ECHO MEAS - SI(CUBED): 7.7 ML/M^2
BH CV ECHO MEAS - SI(MOD-SP4): 16.1 ML/M^2
BH CV ECHO MEAS - SI(TEICH): 10.7 ML/M^2
BH CV ECHO MEAS - SV(AO): 208.2 ML
BH CV ECHO MEAS - SV(CUBED): 11.6 ML
BH CV ECHO MEAS - SV(MOD-SP4): 24.2 ML
BH CV ECHO MEAS - SV(TEICH): 16.1 ML
BH CV ECHO MEAS - TR MAX VEL: 293 CM/SEC
MAXIMAL PREDICTED HEART RATE: 134 BPM
STRESS TARGET HR: 114 BPM

## 2020-02-24 PROCEDURE — 93306 TTE W/DOPPLER COMPLETE: CPT

## 2020-02-24 PROCEDURE — 93880 EXTRACRANIAL BILAT STUDY: CPT

## 2020-02-24 PROCEDURE — 93880 EXTRACRANIAL BILAT STUDY: CPT | Performed by: RADIOLOGY

## 2020-02-24 PROCEDURE — 93306 TTE W/DOPPLER COMPLETE: CPT | Performed by: INTERNAL MEDICINE

## 2020-02-25 NOTE — PROGRESS NOTES
EF good at 66-70%, mild MR, no AS, very small effusion. Continue with lasix therapy. No adjustment to current.

## 2020-08-20 ENCOUNTER — OFFICE VISIT (OUTPATIENT)
Dept: CARDIOLOGY | Facility: CLINIC | Age: 85
End: 2020-08-20

## 2020-08-20 VITALS
SYSTOLIC BLOOD PRESSURE: 120 MMHG | HEIGHT: 61 IN | HEART RATE: 64 BPM | WEIGHT: 119 LBS | BODY MASS INDEX: 22.47 KG/M2 | DIASTOLIC BLOOD PRESSURE: 70 MMHG | TEMPERATURE: 96.4 F

## 2020-08-20 DIAGNOSIS — I73.9 PVD (PERIPHERAL VASCULAR DISEASE) WITH CLAUDICATION (HCC): ICD-10-CM

## 2020-08-20 DIAGNOSIS — E78.00 HYPERCHOLESTEREMIA: ICD-10-CM

## 2020-08-20 DIAGNOSIS — R42 DIZZINESS: ICD-10-CM

## 2020-08-20 DIAGNOSIS — I10 ESSENTIAL HYPERTENSION: ICD-10-CM

## 2020-08-20 DIAGNOSIS — I65.23 BILATERAL CAROTID ARTERY STENOSIS: Primary | ICD-10-CM

## 2020-08-20 PROCEDURE — 99214 OFFICE O/P EST MOD 30 MIN: CPT | Performed by: NURSE PRACTITIONER

## 2020-08-20 RX ORDER — PANTOPRAZOLE SODIUM 40 MG/1
40 TABLET, DELAYED RELEASE ORAL DAILY
Qty: 90 TABLET | Refills: 2 | Status: SHIPPED | OUTPATIENT
Start: 2020-08-20 | End: 2021-08-24 | Stop reason: SDUPTHER

## 2020-08-20 RX ORDER — NITROGLYCERIN 0.4 MG/1
0.4 TABLET SUBLINGUAL
Qty: 30 TABLET | Refills: 1 | Status: SHIPPED | OUTPATIENT
Start: 2020-08-20

## 2020-08-20 RX ORDER — LOSARTAN POTASSIUM 100 MG/1
50 TABLET ORAL DAILY
Qty: 90 TABLET | Refills: 3 | Status: SHIPPED | OUTPATIENT
Start: 2020-08-20 | End: 2021-02-22 | Stop reason: SDUPTHER

## 2020-08-20 NOTE — PROGRESS NOTES
Chief Complaint   Patient presents with   • Follow-up     Cardiac management.   • Lab     Last labs 3 months ago per Dr Lafleur.   • Chest Pain     Started having mid dull chest pain a week ago that has started radiating to back for the last couple of days, this morning she started having nausea. At times when she lays down and she does not burp she will have severe pain.   • Dizziness     Started a week ago.   • Neck pain     Having pain in right side of neck that started a week ago. She reports having a knot on back of neck.   • Shortness of Breath     Started a couple weeks ago when she is laying down.   • Med Refill     Needs refills on Losartan-90 day.     Thierry Pena is a 87 y.o. female with HTN, hyperlipidemia, and PVD who underwent a procedure both on the left and right legs with stenting, angioplasty, and arthrectomy by Dr. Phillips. She also has moderate carotid stenosis. She has not undergone CTA secondary to CKD. Her stress tests have remained negative. Echo has shown chronic small pericardial effusion possibly related to RA. At February 2019 visit, she reported being started on Pletal per Dr. Grace with good benefit. She was able to walk from [employer] parking lot to back of store without stopping for rest. Her PVD was also evaluated by Dr. Bland and according to her, no intervention recommended. Echo and carotid US repeated 2/2020 with similar findings. Low dose Lasix continued.     She returns today for follow up. She denies anginal symptoms or SOB. She has right sided chest discomfort radiating to the right shoulder and neck, feels this is musculoskeletal as she can massage it out. Denies any worsening claudication pain. Her main concern is dizziness, worsening in the last few weeks, she feels off balance. Denies TIA. Blood pressure has been well controlled. Labs followed by Dr. Grace.          Cardiac History:    Past Surgical History:   Procedure Laterality Date   • ANGIOPLASTY   03/11/2014    (R) L/E Angioplasty with stent placement (Dr. Phillips)   • ANGIOPLASTY  02/21/2014    (L) Lower Extremity Angioplasty with stent placement (Dr. Phillips)   • CARDIOVASCULAR STRESS TEST  03/31/2003    MOSES Myoview-(Atropine) 90% THR. Negative   • CARDIOVASCULAR STRESS TEST  06/10/2009    Stress 2 min 22 sec. 68% THR. Negative   • CARDIOVASCULAR STRESS TEST  08/30/2010    P Myoview- Negative   • CARDIOVASCULAR STRESS TEST  09/24/2012    Stress- (Mod) 3 min 32 sec. 89% THR. 182/62. Negative   • CARDIOVASCULAR STRESS TEST  12/17/2014    Lexiscan- ([facility])- EF 65%, negative for ischemia   • CONVERTED (HISTORICAL) HOLTER  12/10/2007    Holter- 58 BPM. One 3 beat SVT ( Dr. Isbell) Multiple artifact   • CONVERTED (HISTORICAL) HOLTER  07/09/2013    Holter- AVG HR 68 BPM   • CT ANGIO LWR EXTREMITY UNILATERAL W WO CONTRAST  07/26/2013    Severe Bilateral Distal Disease   • ECHO - CONVERTED  04/17/2008    Echo- EF 60%, mod MR, small rim of a pericardial effusion   • ECHO - CONVERTED  06/10/2009    Echo- EF >60%. moderate AR. RVSP- 42 mmHg   • ECHO - CONVERTED  09/24/2012    Echo- EF 65-70%, RVSP 47 mmHg   • ECHO - CONVERTED  12/17/2014    Echo- ([facility]) EF 60%, mild MR, mild pericardial effusion   • ECHO - CONVERTED  02/01/2017    EF > 65%,mod MR, RVSP 35-40 mmHg   • ECHO - CONVERTED  02/24/2020    EF 70%. Mild MR. RVSP- 44 mmHg. Small Pericardial Effusion   • EP STUDY  07/28/2010    EP study with ablation of SVT. (Dr. Cochran)   • OTHER SURGICAL HISTORY  07/12/2014    WOO- 0.8 & 0.9   • US CAROTID UNILATERAL  07/21/2015    Carotid- Possible 50-69% stenosis on the right. CTA recommended, creatinine of 1.4   •  CAROTID UNILATERAL  02/24/2020    Moderate stenosis bilaterally      Current Outpatient Medications   Medication Sig Dispense Refill   • ALPRAZolam (XANAX) 0.5 MG tablet Take 0.5 mg by mouth 2 (two) times a day.     • aspirin 81 MG EC tablet Take 81 mg by mouth daily.     • carvedilol (COREG) 3.125 MG tablet Take  3.125 mg by mouth 2 (two) times a day with meals.     • cilostazol (PLETAL) 50 MG tablet Take 50 mg by mouth 2 (Two) Times a Day.     • clopidogrel (PLAVIX) 75 MG tablet Take 75 mg by mouth daily.     • furosemide (LASIX) 20 MG tablet Take 20 mg by mouth Daily.     • gabapentin (NEURONTIN) 300 MG capsule Take 300 mg by mouth 2 (Two) Times a Day.     • hydroxychloroquine (PLAQUENIL) 200 MG tablet Take 200 mg by mouth 2 (two) times a day.     • losartan (COZAAR) 100 MG tablet Take 0.5 tablets by mouth Daily. 90 tablet 3   • NIFEdipine XL (PROCARDIA XL) 30 MG 24 hr tablet Take 30 mg by mouth Daily.     • nitroglycerin (NITROSTAT) 0.4 MG SL tablet Place 1 tablet under the tongue Every 5 (Five) Minutes As Needed for Chest Pain (x3). Take no more than 3 doses in 15 minutes. 30 tablet 1   • nortriptyline (PAMELOR) 10 MG capsule Take 10 mg by mouth every night.     • traMADol (ULTRAM) 50 MG tablet Take 50 mg by mouth 4 (four) times a day.     • Travoprost (TRAVATAN OP) Apply  to eye.     • pantoprazole (PROTONIX) 40 MG EC tablet Take 1 tablet by mouth Daily. 90 tablet 2     No current facility-administered medications for this visit.      Patient has no known allergies.    Past Medical History:   Diagnosis Date   • Abnormal PFT 06/23/2009    Mild restrictive ventilatory defect and diffusion capacity mildly enlarged   • Chest x-ray abnormality 06/11/2009    Small (R) pleural effusion   • Fibromyalgia    • GERD (gastroesophageal reflux disease)    • H/O eye surgery 09/03/2008    Cataracts removed L eye.    • H/O: hysterectomy    • History of cholecystectomy    • Hypertension    • Pericardial effusion     mild   • RA (rheumatoid arthritis) (CMS/HCC)    • Vasculitis (CMS/HCC)    • Vasculitis (CMS/HCC)      Social History     Socioeconomic History   • Marital status:      Spouse name: Not on file   • Number of children: Not on file   • Years of education: Not on file   • Highest education level: Not on file   Tobacco Use   "  • Smoking status: Former Smoker     Last attempt to quit:      Years since quittin.6   • Smokeless tobacco: Never Used   Substance and Sexual Activity   • Alcohol use: No   • Drug use: No     Family History   Problem Relation Age of Onset   • Heart attack Sister      Review of Systems   Constitution: Negative for decreased appetite and malaise/fatigue. Weight loss: down 1 lb.   HENT: Negative.    Eyes: Negative for blurred vision.   Cardiovascular: Positive for claudication (overall improved). Negative for chest pain, dyspnea on exertion, leg swelling, palpitations and syncope.   Respiratory: Negative for shortness of breath and sleep disturbances due to breathing.    Endocrine: Negative.    Hematologic/Lymphatic: Negative for bleeding problem. Does not bruise/bleed easily.   Skin: Negative.    Musculoskeletal: Positive for arthritis (sig RA ), joint pain and stiffness. Negative for falls and myalgias.   Gastrointestinal: Negative for abdominal pain, heartburn and melena.   Genitourinary: Negative for hematuria.   Neurological: Positive for dizziness. Negative for light-headedness.   Psychiatric/Behavioral: Negative for altered mental status.   Allergic/Immunologic: Negative.       Objective     /70 (BP Location: Left arm)   Pulse 64   Temp 96.4 °F (35.8 °C)   Ht 154.9 cm (60.98\")   Wt 54 kg (119 lb)   BMI 22.50 kg/m²     Physical Exam   Constitutional: She is oriented to person, place, and time. She appears well-developed and well-nourished. No distress.   HENT:   Head: Normocephalic.   Eyes: Pupils are equal, round, and reactive to light.   Neck: Normal range of motion. Carotid bruit is present.   Cardiovascular: Normal rate, regular rhythm, S1 normal, S2 normal and intact distal pulses.   Murmur heard.   Systolic murmur is present with a grade of 1/6 at the apex.  Pulses:       Dorsalis pedis pulses are 1+ on the right side, and 1+ on the left side.        Posterior tibial pulses are 1+ on " the right side, and 1+ on the left side.   Pulmonary/Chest: Effort normal and breath sounds normal. No respiratory distress.   Abdominal: Soft. Bowel sounds are normal.   Musculoskeletal: Normal range of motion. She exhibits no edema.   Neurological: She is alert and oriented to person, place, and time.   Skin: Skin is warm and dry. She is not diaphoretic.   Psychiatric: She has a normal mood and affect.   Nursing note and vitals reviewed.     Procedures          Problem List Items Addressed This Visit        Cardiovascular and Mediastinum    HTN (hypertension)    Relevant Medications    losartan (COZAAR) 100 MG tablet    Carotid stenosis - Primary    Relevant Orders    US Carotid Bilateral    Hypercholesteremia    PVD (peripheral vascular disease) with claudication (CMS/HCC)       Other    Dizziness    Relevant Orders    US Carotid Bilateral         1. HTN- well controlled with carvedilol, losartan, and nifedipine. She also takes low dose lasix. Continue the same.     2. Hypercholesterolemia- does not appear to be on statin. Labs followed by Dr. Grace. With carotid stenosis and PAD, may benefit with low dose statin if not contraindicated.     3. PAD- managed with aspirin, Plavix, and Pletal. Claudication remains improved. Pulses are palpable. Continue medical therapy and regular walking.     4. Carotid stenosis- US 2/2020 showed moderate stenosis. Since the dizziness and dysequilibrium have worsened, will repeat carotid US.     Refills sent for losartan. No changes made today. We will see her back in six months or sooner as needed.     Patient's Body mass index is 22.5 kg/m². BMI is within normal parameters. No follow-up required.               Electronically signed by EVELYN Lira,  August 21, 2020 10:01

## 2020-08-25 ENCOUNTER — HOSPITAL ENCOUNTER (OUTPATIENT)
Dept: CARDIOLOGY | Facility: HOSPITAL | Age: 85
Discharge: HOME | End: 2020-08-25
Admitting: NURSE PRACTITIONER

## 2020-08-25 DIAGNOSIS — I65.23 BILATERAL CAROTID ARTERY STENOSIS: ICD-10-CM

## 2020-08-25 DIAGNOSIS — R42 DIZZINESS: ICD-10-CM

## 2020-08-25 PROCEDURE — 93880 EXTRACRANIAL BILAT STUDY: CPT

## 2020-08-25 PROCEDURE — 93880 EXTRACRANIAL BILAT STUDY: CPT | Performed by: RADIOLOGY

## 2021-02-08 ENCOUNTER — TELEPHONE (OUTPATIENT)
Dept: CARDIOLOGY | Facility: CLINIC | Age: 86
End: 2021-02-08

## 2021-02-08 NOTE — TELEPHONE ENCOUNTER
Received fax from Dr. Cabrera for cardiac clearance for patient to have an EGD and a colonoscopy. Patient is on Plavix and they are requesting to hold. According to our records, patient had a lower extremity angioplasty with stent placement on 03/11/14.       Fax [phone]

## 2021-02-22 ENCOUNTER — OFFICE VISIT (OUTPATIENT)
Dept: CARDIOLOGY | Facility: CLINIC | Age: 86
End: 2021-02-22

## 2021-02-22 VITALS
HEART RATE: 68 BPM | TEMPERATURE: 96.8 F | SYSTOLIC BLOOD PRESSURE: 142 MMHG | BODY MASS INDEX: 22.28 KG/M2 | HEIGHT: 61 IN | WEIGHT: 118 LBS | DIASTOLIC BLOOD PRESSURE: 70 MMHG

## 2021-02-22 DIAGNOSIS — I10 ESSENTIAL HYPERTENSION: Primary | ICD-10-CM

## 2021-02-22 DIAGNOSIS — E78.49 OTHER HYPERLIPIDEMIA: ICD-10-CM

## 2021-02-22 DIAGNOSIS — I73.9 PVD (PERIPHERAL VASCULAR DISEASE) WITH CLAUDICATION (HCC): ICD-10-CM

## 2021-02-22 DIAGNOSIS — I65.21 STENOSIS OF RIGHT CAROTID ARTERY: ICD-10-CM

## 2021-02-22 PROCEDURE — 99214 OFFICE O/P EST MOD 30 MIN: CPT | Performed by: NURSE PRACTITIONER

## 2021-02-22 RX ORDER — LOSARTAN POTASSIUM 100 MG/1
50 TABLET ORAL DAILY
Qty: 30 TABLET | Refills: 8 | Status: SHIPPED | OUTPATIENT
Start: 2021-02-22 | End: 2021-11-01

## 2021-02-22 RX ORDER — NIFEDIPINE 30 MG/1
30 TABLET, EXTENDED RELEASE ORAL DAILY
Qty: 30 TABLET | Refills: 8 | Status: SHIPPED | OUTPATIENT
Start: 2021-02-22 | End: 2021-12-15 | Stop reason: SDUPTHER

## 2021-02-22 RX ORDER — CLOPIDOGREL BISULFATE 75 MG/1
75 TABLET ORAL DAILY
Qty: 30 TABLET | Refills: 8 | Status: SHIPPED | OUTPATIENT
Start: 2021-02-22 | End: 2021-12-15 | Stop reason: SDUPTHER

## 2021-02-22 RX ORDER — LATANOPROST 50 UG/ML
1 SOLUTION/ DROPS OPHTHALMIC NIGHTLY
COMMUNITY

## 2021-02-22 RX ORDER — FUROSEMIDE 20 MG/1
20 TABLET ORAL DAILY
Qty: 30 TABLET | Refills: 8 | Status: SHIPPED | OUTPATIENT
Start: 2021-02-22 | End: 2021-12-15 | Stop reason: SDUPTHER

## 2021-02-22 RX ORDER — CILOSTAZOL 50 MG/1
50 TABLET ORAL 2 TIMES DAILY
Qty: 60 TABLET | Refills: 8 | Status: SHIPPED | OUTPATIENT
Start: 2021-02-22 | End: 2021-12-15 | Stop reason: SDUPTHER

## 2021-02-22 RX ORDER — CARVEDILOL 3.12 MG/1
3.12 TABLET ORAL 2 TIMES DAILY WITH MEALS
Qty: 60 TABLET | Refills: 8 | Status: SHIPPED | OUTPATIENT
Start: 2021-02-22 | End: 2021-12-15

## 2021-02-22 NOTE — PROGRESS NOTES
Chief Complaint   Patient presents with   • Follow-up     For cardiac management. Patient is on aspirin. Last lab work was done about a month ago per Dr. Lafleur, not in chart. States that she has been having pain on the right side of her neck especially when she lays down at night. States that she occasionally has shortness of breath. States that she occasionally has palpitations.    • Med Refill     Needs refills on cardiac medications. 30 day supplies to [facility] in [place]. Went over medications verbally.        Cardiac Complaints  dyspnea      Subjective   Kylah Pena is a 87 y.o. female with peripheral vascular disease, murmur, hypertension, carotid stenosis, palpitations, and hypercholesterolemia who underwent a procedure both on the left and right legs with stenting, angioplasty, and arthrectomy by Dr. Phillips. In 2016 HCTZ was stopped due to renal function. The dose of Coreg was increased. Her blood pressure was not at goal and Procardia added. She followed with Dr. Phillips for dizziness, no CTA of carotids done due to high creatine, but he advised to see neurologist which she did not do. He advised to continue medical management. In February 2017 a repeat echocardiogram was done and showed moderate LVH with normal LV ejection fraction, moderate MR and mild pulmonary hypertension. At February 2019 visit, she reported being started on Pletal per Dr. Grace with good benefit. She was able to walk from [place] parking lot to back of store without stopping for rest. Her PVD was also evaluated by Dr. Bland and according to her, no intervention recommended.  Most recent echo in February 2020 showed EF , no tamponade, and RVSP 44.  After last visit (August 2020), carotid US advised for dizziness and showed 50% MADI and 50-69% LICA, repeat recommended in one year.    Patient returns today for follow up and denies any new concerns.  She does have some pain of the right neck area when she lies at night, no  worse than prior. Dizziness and syncope denied. Chest pain denied. She does admit to some occasional SOA and palpitations on rare occasion. Labs remain followed by PCP about a month ago, no current available for review. Followed by Dr. Lafleur. Refills requested for 30 day supply.        Cardiac History  Past Surgical History:   Procedure Laterality Date   • ANGIOPLASTY  03/11/2014    (R) L/E Angioplasty with stent placement (Dr. Phillips)   • ANGIOPLASTY  02/21/2014    (L) Lower Extremity Angioplasty with stent placement (Dr. Phillips)   • CARDIOVASCULAR STRESS TEST  03/31/2003    D. Myoview-(Atropine) 90% THR. Negative   • CARDIOVASCULAR STRESS TEST  06/10/2009    Stress 2 min 22 sec. 68% THR. Negative   • CARDIOVASCULAR STRESS TEST  08/30/2010    P Myoview- Negative   • CARDIOVASCULAR STRESS TEST  09/24/2012    Stress- (Mod) 3 min 32 sec. 89% THR. 182/62. Negative   • CARDIOVASCULAR STRESS TEST  12/17/2014    Lexiscan- ([facility])- EF 65%, negative for ischemia   • CONVERTED (HISTORICAL) HOLTER  12/10/2007    Holter- 58 BPM. One 3 beat SVT ( Dr. Isbell) Multiple artifact   • CONVERTED (HISTORICAL) HOLTER  07/09/2013    Holter- AVG HR 68 BPM   • CT ANGIO LWR EXTREMITY UNILATERAL W WO CONTRAST  07/26/2013    Severe Bilateral Distal Disease   • ECHO - CONVERTED  04/17/2008    Echo- EF 60%, mod MR, small rim of a pericardial effusion   • ECHO - CONVERTED  06/10/2009    Echo- EF >60%. moderate AR. RVSP- 42 mmHg   • ECHO - CONVERTED  09/24/2012    Echo- EF 65-70%, RVSP 47 mmHg   • ECHO - CONVERTED  12/17/2014    Echo- ([facility]) EF 60%, mild MR, mild pericardial effusion   • ECHO - CONVERTED  02/01/2017    EF > 65%,mod MR, RVSP 35-40 mmHg   • ECHO - CONVERTED  02/24/2020    EF 70%. Mild MR. RVSP- 44 mmHg. Small Pericardial Effusion   • EP STUDY  07/28/2010    EP study with ablation of SVT. (Dr. Cochran)   • OTHER SURGICAL HISTORY  07/12/2014    WOO- 0.8 & 0.9   • US CAROTID UNILATERAL  07/21/2015    Carotid- Possible 50-69% stenosis on  the right. CTA recommended, creatinine of 1.4   • US CAROTID UNILATERAL  02/24/2020    Moderate stenosis bilaterally    • US CAROTID UNILATERAL  08/12/2020    Levi:  Moderate bilateral stenosis       Current Outpatient Medications   Medication Sig Dispense Refill   • ALPRAZolam (XANAX) 0.5 MG tablet Take 0.5 mg by mouth 2 (two) times a day.     • aspirin 81 MG EC tablet Take 81 mg by mouth daily.     • carvedilol (COREG) 3.125 MG tablet Take 1 tablet by mouth 2 (Two) Times a Day With Meals. 60 tablet 8   • cilostazol (PLETAL) 50 MG tablet Take 1 tablet by mouth 2 (Two) Times a Day. 60 tablet 8   • clopidogrel (PLAVIX) 75 MG tablet Take 1 tablet by mouth Daily. 30 tablet 8   • furosemide (LASIX) 20 MG tablet Take 1 tablet by mouth Daily. 30 tablet 8   • gabapentin (NEURONTIN) 300 MG capsule Take 300 mg by mouth 2 (Two) Times a Day.     • hydroxychloroquine (PLAQUENIL) 200 MG tablet Take 200 mg by mouth 2 (two) times a day.     • latanoprost (XALATAN) 0.005 % ophthalmic solution 1 drop Every Night.     • losartan (COZAAR) 100 MG tablet Take 0.5 tablets by mouth Daily. 30 tablet 8   • NIFEdipine XL (PROCARDIA XL) 30 MG 24 hr tablet Take 1 tablet by mouth Daily. 30 tablet 8   • nitroglycerin (NITROSTAT) 0.4 MG SL tablet Place 1 tablet under the tongue Every 5 (Five) Minutes As Needed for Chest Pain (x3). Take no more than 3 doses in 15 minutes. 30 tablet 1   • nortriptyline (PAMELOR) 10 MG capsule Take 10 mg by mouth every night.     • pantoprazole (PROTONIX) 40 MG EC tablet Take 1 tablet by mouth Daily. 90 tablet 2   • traMADol (ULTRAM) 50 MG tablet Take 50 mg by mouth 4 (four) times a day.       No current facility-administered medications for this visit.        Patient has no known allergies.    Past Medical History:   Diagnosis Date   • Abnormal PFT 06/23/2009    Mild restrictive ventilatory defect and diffusion capacity mildly enlarged   • Chest x-ray abnormality 06/11/2009    Small (R) pleural effusion   •  "Fibromyalgia    • GERD (gastroesophageal reflux disease)    • H/O eye surgery 2008    Cataracts removed L eye.    • H/O: hysterectomy    • History of cholecystectomy    • Hypertension    • Pericardial effusion     mild   • RA (rheumatoid arthritis) (CMS/HCC)    • Vasculitis (CMS/HCC)    • Vasculitis (CMS/HCC)        Social History     Socioeconomic History   • Marital status:      Spouse name: Not on file   • Number of children: Not on file   • Years of education: Not on file   • Highest education level: Not on file   Tobacco Use   • Smoking status: Former Smoker     Quit date:      Years since quittin.1   • Smokeless tobacco: Never Used   Substance and Sexual Activity   • Alcohol use: No   • Drug use: No       Family History   Problem Relation Age of Onset   • Heart attack Sister        Review of Systems   Constitution: Negative for malaise/fatigue and night sweats.   Cardiovascular: Positive for dyspnea on exertion and palpitations. Negative for chest pain, claudication, irregular heartbeat, leg swelling, near-syncope, orthopnea and syncope.   Respiratory: Positive for shortness of breath. Negative for cough and wheezing.    Musculoskeletal: Positive for stiffness. Negative for back pain and joint pain.   Gastrointestinal: Negative for anorexia, heartburn, melena, nausea and vomiting.   Genitourinary: Negative for dysuria, hematuria, hesitancy and nocturia.   Neurological: Negative for dizziness, light-headedness and loss of balance.   Psychiatric/Behavioral: Negative for depression and memory loss. The patient is not nervous/anxious.            Objective     /70 (BP Location: Left arm)   Pulse 68   Temp 96.8 °F (36 °C)   Ht 154.9 cm (60.98\")   Wt 53.5 kg (118 lb)   BMI 22.31 kg/m²     Constitutional:       Appearance: Healthy appearance. Not in distress.   Eyes:      Pupils: Pupils are equal, round, and reactive to light.   HENT:      Nose: Nose normal.   Neck:      " Musculoskeletal: Normal range of motion and neck supple.   Pulmonary:      Effort: Pulmonary effort is normal.      Breath sounds: Normal breath sounds.   Cardiovascular:      PMI at left midclavicular line. Normal rate. Regular rhythm.      Murmurs: There is a systolic murmur.   Abdominal:      Palpations: Abdomen is soft.   Musculoskeletal: Normal range of motion.   Skin:     General: Skin is warm and dry.   Neurological:      Mental Status: Oriented to person, place and time.         Procedures    Assessment/Plan     SOA/headache:  Improved. Most recent echo showed RVSP stable at 44mmHg. EF stable. On ASA therapy continued at current.     HTN: Blood pressure is well managed. No adjustment to current coreg, cozaar, or procardia therapy advised. Limited sodium intake encouraged.      Headache/dizziness:  Carotid US will be rechecked next visit. She denies any syncope, no worsening dizziness reported.  Neck pain noted, she will follow with rheumatology in regards.  Carotid US results explained, most recent only moderate stenosis noted.     PVD:  Remains on pletal, ASA and plavix therapy.  Bleeding and bruising denied. Claudication pain reported but patient admits it is improved with use of pletal therapy, current continued.     Palpitations:  Reported, but rare. No adjustment to current coreg therapy advised. Limited caffeine intake encouraged and adequate hydration advised.     Hyperlipidemia:  Managing with diet alone. Patient reports FLP monitored by your office. Can we have next for our records?     BMI noted at 22.31.  Patient admits to not eating as much since the death of her son. Adequate protein intake as well as adequate caloric intake encouraged.     6 month follow up recommended or sooner if needed.     Refills per request.              Problems Addressed this Visit        Cardiac and Vasculature    HTN (hypertension) - Primary    Relevant Medications    carvedilol (COREG) 3.125 MG tablet    furosemide  (LASIX) 20 MG tablet    NIFEdipine XL (PROCARDIA XL) 30 MG 24 hr tablet    losartan (COZAAR) 100 MG tablet    Carotid stenosis    Hyperlipemia    PVD (peripheral vascular disease) with claudication (CMS/[facility])      Diagnoses       Codes Comments    Essential hypertension    -  Primary ICD-10-CM: I10  ICD-9-CM: 401.9     Stenosis of right carotid artery     ICD-10-CM: I65.21  ICD-9-CM: 433.10     PVD (peripheral vascular disease) with claudication (CMS/[facility])     ICD-10-CM: I73.9  ICD-9-CM: 443.9     Other hyperlipidemia     ICD-10-CM: E78.49  ICD-9-CM: 272.4           Patient's Body mass index is 22.31 kg/m². BMI is within normal parameters. No follow-up required..            Electronically signed by EVELYN Cain February 22, 2021 16:19 EST

## 2021-05-17 RX ORDER — PANTOPRAZOLE SODIUM 40 MG/1
TABLET, DELAYED RELEASE ORAL
Qty: 90 TABLET | Refills: 1 | OUTPATIENT
Start: 2021-05-17

## 2021-05-24 RX ORDER — PANTOPRAZOLE SODIUM 40 MG/1
TABLET, DELAYED RELEASE ORAL
Qty: 90 TABLET | Refills: 1 | OUTPATIENT
Start: 2021-05-24

## 2021-06-21 ENCOUNTER — TELEPHONE (OUTPATIENT)
Dept: CARDIOLOGY | Facility: CLINIC | Age: 86
End: 2021-06-21

## 2021-06-21 NOTE — TELEPHONE ENCOUNTER
Lor with [facility] called to clarify if patient is to be on pletal and plavix. Amy at [facility] was made aware that patient is to be taking both medications.

## 2021-08-24 ENCOUNTER — OFFICE VISIT (OUTPATIENT)
Dept: CARDIOLOGY | Facility: CLINIC | Age: 86
End: 2021-08-24

## 2021-08-24 VITALS
DIASTOLIC BLOOD PRESSURE: 68 MMHG | HEIGHT: 61 IN | HEART RATE: 74 BPM | WEIGHT: 114.2 LBS | BODY MASS INDEX: 21.56 KG/M2 | SYSTOLIC BLOOD PRESSURE: 136 MMHG

## 2021-08-24 DIAGNOSIS — I31.39 PERICARDIAL EFFUSION: ICD-10-CM

## 2021-08-24 DIAGNOSIS — I65.23 BILATERAL CAROTID ARTERY STENOSIS: ICD-10-CM

## 2021-08-24 DIAGNOSIS — R42 DIZZINESS: Primary | ICD-10-CM

## 2021-08-24 DIAGNOSIS — I73.9 PVD (PERIPHERAL VASCULAR DISEASE) WITH CLAUDICATION (HCC): ICD-10-CM

## 2021-08-24 DIAGNOSIS — I10 ESSENTIAL HYPERTENSION: ICD-10-CM

## 2021-08-24 DIAGNOSIS — R06.02 SHORTNESS OF BREATH: ICD-10-CM

## 2021-08-24 DIAGNOSIS — E78.49 OTHER HYPERLIPIDEMIA: ICD-10-CM

## 2021-08-24 DIAGNOSIS — I34.0 NON-RHEUMATIC MITRAL REGURGITATION: ICD-10-CM

## 2021-08-24 PROCEDURE — 99214 OFFICE O/P EST MOD 30 MIN: CPT | Performed by: NURSE PRACTITIONER

## 2021-08-24 RX ORDER — PANTOPRAZOLE SODIUM 40 MG/1
40 TABLET, DELAYED RELEASE ORAL DAILY
Qty: 90 TABLET | Refills: 2 | Status: SHIPPED | OUTPATIENT
Start: 2021-08-24 | End: 2022-05-20

## 2021-08-24 NOTE — PROGRESS NOTES
Chief Complaint   Patient presents with   • Follow-up     for cardiac management, complains of chest pain and soreness in center of chest, dizziness,. Has shortness of breath at night. Also complains of soreness at the back/side of her head.    • labs     has not had any recent labs done.    • Med Refill     Requesting refills on cardiac medications and Protonix, 90 day supply.        Cardiac Complaints  claudication and Dizziness      Subjective   Kylah Pena is a 88 y.o. female with peripheral vascular disease, murmur, hypertension, carotid stenosis, palpitations, and hypercholesterolemia who underwent a procedure both on the left and right legs with stenting, angioplasty, and arthrectomy by Dr. Phillips. In 2016 HCTZ was stopped due to renal function. The dose of Coreg was increased. Her blood pressure was not at goal and Procardia added. She followed with Dr. Phillips for dizziness, no CTA of carotids done due to high creatine, but he advised to see neurologist which she did not do. He advised to continue medical management. In February 2017 a repeat echocardiogram was done and showed moderate LVH with normal LV ejection fraction, moderate MR and mild pulmonary hypertension. At February 2019 visit, she reported being started on Pletal per Dr. Grace with good benefit. She was able to walk from [employer] parking lot to back of store without stopping for rest. Her PVD was also evaluated by Dr. Bland and according to her, no intervention recommended.  Most recent echo in February 2020 showed EF , no tamponade, and RVSP 44.  After last visit (August 2020), carotid US advised for dizziness and showed 50% MADI and 50-69% LICA, repeat recommended in one year.    She returns today for follow up and denies any new concerns.  She does have some chest pain that she describes as tight in the center of the chest, which she reports as more GERD, she has been without protonix for several weeks now, she is requesting refill.   She admits her throat has been stretched by GI in the past. She does have some headaches and dizziness which have remained unchanged from prior, no falls or syncope denied.  Claudication reported but ulceration denied, she continues with pletal therapy. Labs remain followed by PCP and will be updated again soon. Refills requested for 90 day supply.           Cardiac History  Past Surgical History:   Procedure Laterality Date   • ANGIOPLASTY  03/11/2014    (R) L/E Angioplasty with stent placement (Dr. Phillips)   • ANGIOPLASTY  02/21/2014    (L) Lower Extremity Angioplasty with stent placement (Dr. Phillips)   • CARDIOVASCULAR STRESS TEST  03/31/2003    D. Myoview-(Atropine) 90% THR. Negative   • CARDIOVASCULAR STRESS TEST  06/10/2009    Stress 2 min 22 sec. 68% THR. Negative   • CARDIOVASCULAR STRESS TEST  08/30/2010    P Myoview- Negative   • CARDIOVASCULAR STRESS TEST  09/24/2012    Stress- (Mod) 3 min 32 sec. 89% THR. 182/62. Negative   • CARDIOVASCULAR STRESS TEST  12/17/2014    Lexiscan- ([facility])- EF 65%, negative for ischemia   • CONVERTED (HISTORICAL) HOLTER  12/10/2007    Holter- 58 BPM. One 3 beat SVT ( Dr. Isbell) Multiple artifact   • CONVERTED (HISTORICAL) HOLTER  07/09/2013    Holter- AVG HR 68 BPM   • CT ANGIO LWR EXTREMITY UNILATERAL W WO CONTRAST  07/26/2013    Severe Bilateral Distal Disease   • ECHO - CONVERTED  04/17/2008    Echo- EF 60%, mod MR, small rim of a pericardial effusion   • ECHO - CONVERTED  06/10/2009    Echo- EF >60%. moderate AR. RVSP- 42 mmHg   • ECHO - CONVERTED  09/24/2012    Echo- EF 65-70%, RVSP 47 mmHg   • ECHO - CONVERTED  12/17/2014    Echo- ([facility]) EF 60%, mild MR, mild pericardial effusion   • ECHO - CONVERTED  02/01/2017    EF > 65%,mod MR, RVSP 35-40 mmHg   • ECHO - CONVERTED  02/24/2020    EF 70%. Mild MR. RVSP- 44 mmHg. Small Pericardial Effusion   • EP STUDY  07/28/2010    EP study with ablation of SVT. (Dr. Cochran)   • OTHER SURGICAL HISTORY  07/12/2014    WOO- 0.8 & 0.9   •  US CAROTID UNILATERAL  07/21/2015    Carotid- Possible 50-69% stenosis on the right. CTA recommended, creatinine of 1.4   • US CAROTID UNILATERAL  02/24/2020    Moderate stenosis bilaterally    • US CAROTID UNILATERAL  08/12/2020    Levi:  Moderate bilateral stenosis       Current Outpatient Medications   Medication Sig Dispense Refill   • ALPRAZolam (XANAX) 0.5 MG tablet Take 0.5 mg by mouth 2 (two) times a day.     • aspirin 81 MG EC tablet Take 81 mg by mouth daily.     • carvedilol (COREG) 3.125 MG tablet Take 1 tablet by mouth 2 (Two) Times a Day With Meals. 60 tablet 8   • cilostazol (PLETAL) 50 MG tablet Take 1 tablet by mouth 2 (Two) Times a Day. 60 tablet 8   • clopidogrel (PLAVIX) 75 MG tablet Take 1 tablet by mouth Daily. 30 tablet 8   • furosemide (LASIX) 20 MG tablet Take 1 tablet by mouth Daily. 30 tablet 8   • gabapentin (NEURONTIN) 300 MG capsule Take 300 mg by mouth 2 (Two) Times a Day.     • latanoprost (XALATAN) 0.005 % ophthalmic solution 1 drop Every Night.     • losartan (COZAAR) 100 MG tablet Take 0.5 tablets by mouth Daily. 30 tablet 8   • methotrexate 2.5 MG tablet Take 2.5 mg by mouth 1 (One) Time Per Week. Take 4 tablets daily on Thursday     • NIFEdipine XL (PROCARDIA XL) 30 MG 24 hr tablet Take 1 tablet by mouth Daily. 30 tablet 8   • nitroglycerin (NITROSTAT) 0.4 MG SL tablet Place 1 tablet under the tongue Every 5 (Five) Minutes As Needed for Chest Pain (x3). Take no more than 3 doses in 15 minutes. 30 tablet 1   • nortriptyline (PAMELOR) 10 MG capsule Take 10 mg by mouth every night.     • pantoprazole (PROTONIX) 40 MG EC tablet Take 1 tablet by mouth Daily. 90 tablet 2   • traMADol (ULTRAM) 50 MG tablet Take 50 mg by mouth 4 (four) times a day.       No current facility-administered medications for this visit.       Patient has no known allergies.    Past Medical History:   Diagnosis Date   • Abnormal PFT 06/23/2009    Mild restrictive ventilatory defect and diffusion capacity mildly  "enlarged   • Chest x-ray abnormality 2009    Small (R) pleural effusion   • Fibromyalgia    • GERD (gastroesophageal reflux disease)    • H/O eye surgery 2008    Cataracts removed L eye.    • H/O: hysterectomy    • History of cholecystectomy    • Hypertension    • Pericardial effusion     mild   • RA (rheumatoid arthritis) (CMS/HCC)    • Vasculitis (CMS/HCC)    • Vasculitis (CMS/HCC)        Social History     Socioeconomic History   • Marital status:      Spouse name: Not on file   • Number of children: Not on file   • Years of education: Not on file   • Highest education level: Not on file   Tobacco Use   • Smoking status: Former Smoker     Quit date:      Years since quittin.6   • Smokeless tobacco: Never Used   Vaping Use   • Vaping Use: Never used   Substance and Sexual Activity   • Alcohol use: No   • Drug use: No       Family History   Problem Relation Age of Onset   • Heart attack Sister        Review of Systems   Constitutional: Negative for malaise/fatigue and night sweats.   Cardiovascular: Positive for claudication and dyspnea on exertion. Negative for chest pain, irregular heartbeat, leg swelling, near-syncope, orthopnea, palpitations and syncope.   Respiratory: Positive for shortness of breath. Negative for cough and wheezing.    Musculoskeletal: Positive for stiffness. Negative for back pain and joint pain.   Gastrointestinal: Positive for heartburn. Negative for anorexia, melena, nausea and vomiting.   Genitourinary: Negative for dysuria, hematuria, hesitancy and nocturia.   Neurological: Positive for dizziness and headaches. Negative for light-headedness and loss of balance.   Psychiatric/Behavioral: Negative for depression and memory loss. The patient is not nervous/anxious.            Objective     /68 (BP Location: Left arm, Patient Position: Sitting)   Pulse 74   Ht 154.9 cm (60.98\")   Wt 51.8 kg (114 lb 3.2 oz)   BMI 21.59 kg/m²     Constitutional:       " Appearance: Not in distress.   Eyes:      Pupils: Pupils are equal, round, and reactive to light.   HENT:      Nose: Nose normal.   Pulmonary:      Effort: Pulmonary effort is normal.      Breath sounds: Normal breath sounds.   Cardiovascular:      PMI at left midclavicular line. Normal rate. Regular rhythm.      Murmurs: There is a systolic murmur.   Abdominal:      Palpations: Abdomen is soft.   Musculoskeletal: Normal range of motion.      Cervical back: Normal range of motion and neck supple. Skin:     General: Skin is warm and dry.   Neurological:      Mental Status: Oriented to person, place and time.         Procedures    Assessment/Plan     SOA/headache:  Present but no worse than prior. Repeat echo advised to assess valve areas/LV function/and for any pericardial effusion as she has had once in the past.     HTN: Blood pressure is well managed. No adjustment to current coreg, cozaar, or procardia therapy advised. Limited sodium intake encouraged.      Headache/dizziness:  Carotid US advised to reassess carotid stenosis. She denies any syncope, no worsening dizziness reported.  Neck pain noted, she will follow with rheumatology in regards.      PVD:  Remains on pletal, ASA and plavix therapy.  Bleeding and bruising denied. Claudication pain reported but patient admits it is improved with use of pletal therapy, no ulcerations seen. Same continued.     Palpitations:  Reported, but rare. No adjustment to current coreg therapy advised. Limited caffeine intake encouraged and adequate hydration advised.     Hyperlipidemia:  Managing with diet alone. Patient reports FLP monitored by your office. Can we have next for our records?     BMI noted at 21.59.  Patient admits to not eating as much since the death of her son. Adequate protein intake as well as adequate caloric intake encouraged.      6 month follow up recommended or sooner if needed.     Refills per request.         Problems Addressed this Visit         Cardiac and Vasculature    HTN (hypertension)    Carotid stenosis    Relevant Orders    US Carotid Bilateral    Adult Transthoracic Echo Complete W/ Cont if Necessary Per Protocol    Hyperlipemia    Non-rheumatic mitral regurgitation    PVD (peripheral vascular disease) with claudication (CMS/HCC)       Symptoms and Signs    Dizziness - Primary    Relevant Orders    US Carotid Bilateral    Adult Transthoracic Echo Complete W/ Cont if Necessary Per Protocol      Other Visit Diagnoses     Pericardial effusion        Relevant Orders    Adult Transthoracic Echo Complete W/ Cont if Necessary Per Protocol    Shortness of breath        Relevant Orders    Adult Transthoracic Echo Complete W/ Cont if Necessary Per Protocol      Diagnoses       Codes Comments    Dizziness    -  Primary ICD-10-CM: R42  ICD-9-CM: 780.4     Bilateral carotid artery stenosis     ICD-10-CM: I65.23  ICD-9-CM: 433.10, 433.30     Pericardial effusion     ICD-10-CM: I31.3  ICD-9-CM: 423.9     Shortness of breath     ICD-10-CM: R06.02  ICD-9-CM: 786.05     Essential hypertension     ICD-10-CM: I10  ICD-9-CM: 401.9     Other hyperlipidemia     ICD-10-CM: E78.49  ICD-9-CM: 272.4     PVD (peripheral vascular disease) with claudication (CMS/HCC)     ICD-10-CM: I73.9  ICD-9-CM: 443.9     Non-rheumatic mitral regurgitation     ICD-10-CM: I34.0  ICD-9-CM: 424.0           Patient's Body mass index is 21.59 kg/m². indicating that she is WNW, continued cardiac diet advised.             Electronically signed by Shasta Barros, EVELYN August 24, 2021 12:29 EDT

## 2021-09-21 ENCOUNTER — HOSPITAL ENCOUNTER (OUTPATIENT)
Dept: CARDIOLOGY | Facility: HOSPITAL | Age: 86
Discharge: HOME | End: 2021-09-21

## 2021-09-21 VITALS — HEIGHT: 61 IN | WEIGHT: 114.2 LBS | BODY MASS INDEX: 21.56 KG/M2

## 2021-09-21 DIAGNOSIS — R42 DIZZINESS: ICD-10-CM

## 2021-09-21 DIAGNOSIS — R06.02 SHORTNESS OF BREATH: ICD-10-CM

## 2021-09-21 DIAGNOSIS — I65.23 BILATERAL CAROTID ARTERY STENOSIS: ICD-10-CM

## 2021-09-21 DIAGNOSIS — I31.39 PERICARDIAL EFFUSION: ICD-10-CM

## 2021-09-21 LAB
AORTIC DIMENSIONLESS INDEX: 1 (DI)
BH CV ECHO MEAS - ACS: 1.8 CM
BH CV ECHO MEAS - AO MAX PG (FULL): -0.77 MMHG
BH CV ECHO MEAS - AO MAX PG: 5.4 MMHG
BH CV ECHO MEAS - AO MEAN PG (FULL): 0 MMHG
BH CV ECHO MEAS - AO MEAN PG: 3 MMHG
BH CV ECHO MEAS - AO ROOT AREA (BSA CORRECTED): 2.2
BH CV ECHO MEAS - AO ROOT AREA: 8 CM^2
BH CV ECHO MEAS - AO ROOT DIAM: 3.2 CM
BH CV ECHO MEAS - AO V2 MAX: 116 CM/SEC
BH CV ECHO MEAS - AO V2 MEAN: 74.7 CM/SEC
BH CV ECHO MEAS - AO V2 VTI: 24.8 CM
BH CV ECHO MEAS - BSA(HAYCOCK): 1.5 M^2
BH CV ECHO MEAS - BSA(HAYCOCK): 1.5 M^2
BH CV ECHO MEAS - BSA: 1.5 M^2
BH CV ECHO MEAS - BSA: 1.5 M^2
BH CV ECHO MEAS - BZI_BMI: 21.5 KILOGRAMS/M^2
BH CV ECHO MEAS - BZI_BMI: 21.5 KILOGRAMS/M^2
BH CV ECHO MEAS - BZI_METRIC_HEIGHT: 154.9 CM
BH CV ECHO MEAS - BZI_METRIC_HEIGHT: 154.9 CM
BH CV ECHO MEAS - BZI_METRIC_WEIGHT: 51.7 KG
BH CV ECHO MEAS - BZI_METRIC_WEIGHT: 51.7 KG
BH CV ECHO MEAS - EDV(CUBED): 30.4 ML
BH CV ECHO MEAS - EDV(TEICH): 38.5 ML
BH CV ECHO MEAS - EF(CUBED): 74.8 %
BH CV ECHO MEAS - EF(MOD-BP): 68 %
BH CV ECHO MEAS - EF(TEICH): 68.2 %
BH CV ECHO MEAS - ESV(CUBED): 7.6 ML
BH CV ECHO MEAS - ESV(TEICH): 12.2 ML
BH CV ECHO MEAS - FS: 36.9 %
BH CV ECHO MEAS - IVS/LVPW: 0.93
BH CV ECHO MEAS - IVSD: 1.3 CM
BH CV ECHO MEAS - LA DIMENSION: 3.4 CM
BH CV ECHO MEAS - LA/AO: 1.1
BH CV ECHO MEAS - LAT PEAK E' VEL: 4.9 CM/SEC
BH CV ECHO MEAS - LV IVRT: 0.11 SEC
BH CV ECHO MEAS - LV MASS(C)D: 130.2 GRAMS
BH CV ECHO MEAS - LV MASS(C)DI: 87.5 GRAMS/M^2
BH CV ECHO MEAS - LV MAX PG: 6.2 MMHG
BH CV ECHO MEAS - LV MEAN PG: 3 MMHG
BH CV ECHO MEAS - LV V1 MAX: 124 CM/SEC
BH CV ECHO MEAS - LV V1 MEAN: 74.9 CM/SEC
BH CV ECHO MEAS - LV V1 VTI: 25.2 CM
BH CV ECHO MEAS - LVIDD: 3.1 CM
BH CV ECHO MEAS - LVIDS: 2 CM
BH CV ECHO MEAS - LVPWD: 1.3 CM
BH CV ECHO MEAS - MED PEAK E' VEL: 4.2 CM/SEC
BH CV ECHO MEAS - MV A MAX VEL: 152 CM/SEC
BH CV ECHO MEAS - MV DEC SLOPE: 530 CM/SEC^2
BH CV ECHO MEAS - MV DEC TIME: 0.28 SEC
BH CV ECHO MEAS - MV E MAX VEL: 80.6 CM/SEC
BH CV ECHO MEAS - MV E/A: 0.53
BH CV ECHO MEAS - MV MAX PG: 8.9 MMHG
BH CV ECHO MEAS - MV MEAN PG: 3 MMHG
BH CV ECHO MEAS - MV P1/2T MAX VEL: 93.7 CM/SEC
BH CV ECHO MEAS - MV P1/2T: 51.8 MSEC
BH CV ECHO MEAS - MV V2 MAX: 149 CM/SEC
BH CV ECHO MEAS - MV V2 MEAN: 83.5 CM/SEC
BH CV ECHO MEAS - MV V2 VTI: 27.5 CM
BH CV ECHO MEAS - MVA P1/2T LCG: 2.3 CM^2
BH CV ECHO MEAS - MVA(P1/2T): 4.2 CM^2
BH CV ECHO MEAS - PA MAX PG (FULL): 2.5 MMHG
BH CV ECHO MEAS - PA MAX PG: 4.9 MMHG
BH CV ECHO MEAS - PA MEAN PG (FULL): 1.5 MMHG
BH CV ECHO MEAS - PA MEAN PG: 2.5 MMHG
BH CV ECHO MEAS - PA V2 MAX: 110.5 CM/SEC
BH CV ECHO MEAS - PA V2 MEAN: 72.7 CM/SEC
BH CV ECHO MEAS - PA V2 VTI: 22.4 CM
BH CV ECHO MEAS - PI END-D VEL: 166.5 CM/SEC
BH CV ECHO MEAS - RAP SYSTOLE: 10 MMHG
BH CV ECHO MEAS - RV MAX PG: 2.4 MMHG
BH CV ECHO MEAS - RV MEAN PG: 1 MMHG
BH CV ECHO MEAS - RV V1 MAX: 77.4 CM/SEC
BH CV ECHO MEAS - RV V1 MEAN: 51.4 CM/SEC
BH CV ECHO MEAS - RV V1 VTI: 17 CM
BH CV ECHO MEAS - RVDD: 2.7 CM
BH CV ECHO MEAS - RVSP: 49 MMHG
BH CV ECHO MEAS - SI(AO): 134.1 ML/M^2
BH CV ECHO MEAS - SI(CUBED): 15.3 ML/M^2
BH CV ECHO MEAS - SI(TEICH): 17.7 ML/M^2
BH CV ECHO MEAS - SV(AO): 199.5 ML
BH CV ECHO MEAS - SV(CUBED): 22.7 ML
BH CV ECHO MEAS - SV(TEICH): 26.3 ML
BH CV ECHO MEAS - TR MAX VEL: 313 CM/SEC
BH CV ECHO MEASUREMENTS AVERAGE E/E' RATIO: 17.71
BH CV XLRA MEAS LEFT CCA RATIO VEL: -53.8 CM/SEC
BH CV XLRA MEAS LEFT DIST CCA EDV: -12.6 CM/SEC
BH CV XLRA MEAS LEFT DIST CCA PSV: -54.1 CM/SEC
BH CV XLRA MEAS LEFT DIST ICA EDV: -39.6 CM/SEC
BH CV XLRA MEAS LEFT DIST ICA PSV: -140.4 CM/SEC
BH CV XLRA MEAS LEFT ICA RATIO VEL: -140 CM/SEC
BH CV XLRA MEAS LEFT ICA/CCA RATIO: 2.6
BH CV XLRA MEAS LEFT MID ICA EDV: -35.4 CM/SEC
BH CV XLRA MEAS LEFT MID ICA PSV: -132 CM/SEC
BH CV XLRA MEAS LEFT PROX CCA EDV: 12.8 CM/SEC
BH CV XLRA MEAS LEFT PROX CCA PSV: 61.9 CM/SEC
BH CV XLRA MEAS LEFT PROX ECA EDV: -10.7 CM/SEC
BH CV XLRA MEAS LEFT PROX ECA PSV: -124.5 CM/SEC
BH CV XLRA MEAS LEFT PROX ICA EDV: 17.9 CM/SEC
BH CV XLRA MEAS LEFT PROX ICA PSV: 66.4 CM/SEC
BH CV XLRA MEAS LEFT VERTEBRAL A EDV: 18.4 CM/SEC
BH CV XLRA MEAS LEFT VERTEBRAL A PSV: 52.6 CM/SEC
BH CV XLRA MEAS RIGHT CCA RATIO VEL: 45.7 CM/SEC
BH CV XLRA MEAS RIGHT DIST CCA EDV: 13.3 CM/SEC
BH CV XLRA MEAS RIGHT DIST CCA PSV: 46.2 CM/SEC
BH CV XLRA MEAS RIGHT DIST ICA EDV: -16.9 CM/SEC
BH CV XLRA MEAS RIGHT DIST ICA PSV: -47.2 CM/SEC
BH CV XLRA MEAS RIGHT ICA RATIO VEL: -80.5 CM/SEC
BH CV XLRA MEAS RIGHT ICA/CCA RATIO: -1.8
BH CV XLRA MEAS RIGHT MID ICA EDV: -18.2 CM/SEC
BH CV XLRA MEAS RIGHT MID ICA PSV: -59.1 CM/SEC
BH CV XLRA MEAS RIGHT PROX CCA EDV: 12.3 CM/SEC
BH CV XLRA MEAS RIGHT PROX CCA PSV: 63.3 CM/SEC
BH CV XLRA MEAS RIGHT PROX ECA EDV: -6.9 CM/SEC
BH CV XLRA MEAS RIGHT PROX ECA PSV: -85.9 CM/SEC
BH CV XLRA MEAS RIGHT PROX ICA EDV: -18.7 CM/SEC
BH CV XLRA MEAS RIGHT PROX ICA PSV: -81 CM/SEC
BH CV XLRA MEAS RIGHT VERTEBRAL A EDV: 13.8 CM/SEC
BH CV XLRA MEAS RIGHT VERTEBRAL A PSV: 47.6 CM/SEC
MAXIMAL PREDICTED HEART RATE: 132 BPM
SINUS: 3.6 CM
STRESS TARGET HR: 112 BPM

## 2021-09-21 PROCEDURE — 93306 TTE W/DOPPLER COMPLETE: CPT

## 2021-09-21 PROCEDURE — 93880 EXTRACRANIAL BILAT STUDY: CPT

## 2021-09-21 PROCEDURE — 93306 TTE W/DOPPLER COMPLETE: CPT | Performed by: INTERNAL MEDICINE

## 2021-09-21 PROCEDURE — 93880 EXTRACRANIAL BILAT STUDY: CPT | Performed by: RADIOLOGY

## 2021-09-22 NOTE — PROGRESS NOTES
EF 70%, small effusion, mild MR, mod TR, effusion less than 1cm.  Increase lasix to 40mg for 3 days, then go back to 20mg daily.

## 2021-11-01 RX ORDER — LOSARTAN POTASSIUM 100 MG/1
TABLET ORAL
Qty: 45 TABLET | Refills: 3 | Status: SHIPPED | OUTPATIENT
Start: 2021-11-01 | End: 2021-12-15 | Stop reason: SDUPTHER

## 2021-11-24 ENCOUNTER — OUTSIDE FACILITY SERVICE (OUTPATIENT)
Dept: CARDIOLOGY | Facility: CLINIC | Age: 86
End: 2021-11-24

## 2021-11-24 PROCEDURE — 99214 OFFICE O/P EST MOD 30 MIN: CPT | Performed by: INTERNAL MEDICINE

## 2021-11-24 PROCEDURE — 93306 TTE W/DOPPLER COMPLETE: CPT | Performed by: INTERNAL MEDICINE

## 2021-11-24 PROCEDURE — 93018 CV STRESS TEST I&R ONLY: CPT | Performed by: INTERNAL MEDICINE

## 2021-11-24 PROCEDURE — 78452 HT MUSCLE IMAGE SPECT MULT: CPT | Performed by: INTERNAL MEDICINE

## 2021-12-15 ENCOUNTER — OFFICE VISIT (OUTPATIENT)
Dept: CARDIOLOGY | Facility: CLINIC | Age: 86
End: 2021-12-15

## 2021-12-15 VITALS
HEART RATE: 84 BPM | WEIGHT: 113 LBS | HEIGHT: 60 IN | DIASTOLIC BLOOD PRESSURE: 70 MMHG | BODY MASS INDEX: 22.19 KG/M2 | SYSTOLIC BLOOD PRESSURE: 150 MMHG

## 2021-12-15 DIAGNOSIS — I10 PRIMARY HYPERTENSION: ICD-10-CM

## 2021-12-15 DIAGNOSIS — E78.49 OTHER HYPERLIPIDEMIA: ICD-10-CM

## 2021-12-15 DIAGNOSIS — I73.9 PVD (PERIPHERAL VASCULAR DISEASE) WITH CLAUDICATION (HCC): Primary | ICD-10-CM

## 2021-12-15 DIAGNOSIS — I65.21 STENOSIS OF RIGHT CAROTID ARTERY: ICD-10-CM

## 2021-12-15 PROCEDURE — 99214 OFFICE O/P EST MOD 30 MIN: CPT | Performed by: NURSE PRACTITIONER

## 2021-12-15 RX ORDER — PRAVASTATIN SODIUM 20 MG
20 TABLET ORAL DAILY
Qty: 90 TABLET | Refills: 3 | Status: SHIPPED | OUTPATIENT
Start: 2021-12-15 | End: 2022-12-21

## 2021-12-15 RX ORDER — CILOSTAZOL 50 MG/1
50 TABLET ORAL 2 TIMES DAILY
Qty: 180 TABLET | Refills: 3 | Status: SHIPPED | OUTPATIENT
Start: 2021-12-15 | End: 2022-12-21

## 2021-12-15 RX ORDER — CARVEDILOL 3.12 MG/1
TABLET ORAL
Qty: 180 TABLET | Refills: 2 | Status: SHIPPED | OUTPATIENT
Start: 2021-12-15 | End: 2021-12-15 | Stop reason: DRUGHIGH

## 2021-12-15 RX ORDER — FUROSEMIDE 20 MG/1
20 TABLET ORAL DAILY
Qty: 90 TABLET | Refills: 3 | Status: SHIPPED | OUTPATIENT
Start: 2021-12-15 | End: 2023-01-05 | Stop reason: SDUPTHER

## 2021-12-15 RX ORDER — NIFEDIPINE 30 MG/1
30 TABLET, EXTENDED RELEASE ORAL DAILY
Qty: 90 TABLET | Refills: 3 | Status: SHIPPED | OUTPATIENT
Start: 2021-12-15 | End: 2022-12-21

## 2021-12-15 RX ORDER — CARVEDILOL 6.25 MG/1
6.25 TABLET ORAL 2 TIMES DAILY
Qty: 180 TABLET | Refills: 3 | Status: SHIPPED | OUTPATIENT
Start: 2021-12-15 | End: 2022-12-21

## 2021-12-15 RX ORDER — LOSARTAN POTASSIUM 100 MG/1
50 TABLET ORAL DAILY
Qty: 45 TABLET | Refills: 3 | Status: SHIPPED | OUTPATIENT
Start: 2021-12-15 | End: 2023-01-05 | Stop reason: SDUPTHER

## 2021-12-15 RX ORDER — CLOPIDOGREL BISULFATE 75 MG/1
75 TABLET ORAL DAILY
Qty: 90 TABLET | Refills: 3 | Status: SHIPPED | OUTPATIENT
Start: 2021-12-15 | End: 2023-01-05 | Stop reason: SDUPTHER

## 2021-12-15 NOTE — PROGRESS NOTES
"Chief Complaint   Patient presents with   • Hsp follow up     cardiac management.  Pt brought med bottles. No changes to cardiac meds from recent hsp stay.  Carotid US done at discharge, patient was not given results prior to discharge.    • Med Refill     requests cardiac refills to [facility], [place] days   • Dizziness     pt reports the last 3 days having alot of dizziness and nausea.  c/o vision changes also, \"sometimes everything goes black\"   • Palpitations     over the last few months noticing intermittent fast HR and feeling it up in both sides of neck.     Thierry Pena is a 88 y.o. female with RA, SVT s/p ablation, PVD, HTN, dyslipidemia and carotid stenosis. She underwent angioplasty and stenting to both legs by Dr. Phillips in 2014. In 2016, HCTZ stopped due to renal function. Coreg was increased. Blood pressure was not at goal and Procardia added. She followed with Dr. Phillips for dizziness, no CTA of carotids done due to high creatine, but he advised to see neurologist which she did not do. He advised to continue medical management. At February 2019 visit, she reported being started on Pletal per Dr. Grace with good benefit. She was able to walk from [employer] parking lot to back of store without stopping for rest. Her PVD was also evaluated by Dr. Bland and according to her, no intervention recommended. Carotid US 2/2020 for dizziness and showed 50% MADI and 50-69% LICA. One year fu on 8/12/21 showed no change. Echo also remained stable.     She returns today for hospital follow up. She underwent biopsy temporal artery outpatient per Dr. Frausto on 11/23//21. After the procedure, she c/o chest pain radiating to left shoulder found to have bp 190/90. She was admitted to [facility]. EKG and troponin neg. Pain resolved with nitro patch. Lexiscan negative for ischemia. Carotid US reported as no flow on the right and <50% on the left. Suboptimal study suggested. Lipids showed LDL 60, HDL 43, Tri 152, " , GFR 48.  She denies chest pain. Temporal artery biopsy reported as negative per pt.        Cardiac History:    Past Surgical History:   Procedure Laterality Date   • ANGIOPLASTY  03/11/2014    (R) L/E Angioplasty with stent placement (Dr. Phillips)   • ANGIOPLASTY  02/21/2014    (L) Lower Extremity Angioplasty with stent placement (Dr. Phillips)   • CARDIOVASCULAR STRESS TEST  03/31/2003    D. Myoview-(Atropine) 90% THR. Negative   • CARDIOVASCULAR STRESS TEST  06/10/2009    Stress 2 min 22 sec. 68% THR. Negative   • CARDIOVASCULAR STRESS TEST  08/30/2010    P Myoview- Negative   • CARDIOVASCULAR STRESS TEST  09/24/2012    Stress- (Mod) 3 min 32 sec. 89% THR. 182/62. Negative   • CARDIOVASCULAR STRESS TEST  12/17/2014    Lexiscan- ([facility])- EF 65%, negative for ischemia   • CARDIOVASCULAR STRESS TEST  11/24/2021    ([facility]) Lexiscan- EF >70%, no ishcemia   • CONVERTED (HISTORICAL) HOLTER  12/10/2007    Holter- 58 BPM. One 3 beat SVT ( Dr. Isbell) Multiple artifact   • CONVERTED (HISTORICAL) HOLTER  07/09/2013    Holter- AVG HR 68 BPM   • CT ANGIO LWR EXTREMITY UNILATERAL W WO CONTRAST  07/26/2013    Severe Bilateral Distal Disease   • ECHO - CONVERTED  04/17/2008    Echo- EF 60%, mod MR, small rim of a pericardial effusion   • ECHO - CONVERTED  06/10/2009    Echo- EF >60%. moderate AR. RVSP- 42 mmHg   • ECHO - CONVERTED  09/24/2012    Echo- EF 65-70%, RVSP 47 mmHg   • ECHO - CONVERTED  12/17/2014    Echo- ([facility]) EF 60%, mild MR, mild pericardial effusion   • ECHO - CONVERTED  02/01/2017    EF > 65%,mod MR, RVSP 35-40 mmHg   • ECHO - CONVERTED  02/24/2020    EF 70%. Mild MR. RVSP- 44 mmHg. Small Pericardial Effusion   • ECHO - CONVERTED  09/21/2021    EF > 70%. Mild MR. RVSP- m49 mmHg. Small PE   • ECHO - CONVERTED  11/24/2021    ([facility]) EF 65%, LVH, sm pericardial eff, RVSP 51 mmHg   • EP STUDY  07/28/2010    EP study with ablation of SVT. (Dr. Cochran)   • OTHER SURGICAL HISTORY  07/12/2014    WOO- 0.8 & 0.9   • US  CAROTID UNILATERAL  07/21/2015    Carotid- Possible 50-69% stenosis on the right. CTA recommended, creatinine of 1.4   • US CAROTID UNILATERAL  02/24/2020    Moderate stenosis bilaterally    • US CAROTID UNILATERAL  08/12/2021    Levi:  Moderate bilateral stenosis   • US CAROTID UNILATERAL  11/24/2021    No flow to MADI, <50% LICA (suboptimal study)     Current Outpatient Medications   Medication Sig Dispense Refill   • ALPRAZolam (XANAX) 0.5 MG tablet Take 0.5 mg by mouth 2 (two) times a day.     • aspirin 81 MG EC tablet Take 81 mg by mouth daily.     • cilostazol (PLETAL) 50 MG tablet Take 1 tablet by mouth 2 (Two) Times a Day. 180 tablet 3   • clopidogrel (PLAVIX) 75 MG tablet Take 1 tablet by mouth Daily. 90 tablet 3   • furosemide (LASIX) 20 MG tablet Take 1 tablet by mouth Daily. 90 tablet 3   • gabapentin (NEURONTIN) 300 MG capsule Take 300 mg by mouth 3 (Three) Times a Day.     • latanoprost (XALATAN) 0.005 % ophthalmic solution 1 drop Every Night.     • losartan (COZAAR) 100 MG tablet Take 0.5 tablets by mouth Daily. 45 tablet 3   • methotrexate 2.5 MG tablet Take 2.5 mg by mouth 1 (One) Time Per Week. Take 4 tablets daily on Thursday     • NIFEdipine XL (PROCARDIA XL) 30 MG 24 hr tablet Take 1 tablet by mouth Daily. 90 tablet 3   • nitroglycerin (NITROSTAT) 0.4 MG SL tablet Place 1 tablet under the tongue Every 5 (Five) Minutes As Needed for Chest Pain (x3). Take no more than 3 doses in 15 minutes. 30 tablet 1   • nortriptyline (PAMELOR) 10 MG capsule Take 10 mg by mouth every night.     • pantoprazole (PROTONIX) 40 MG EC tablet Take 1 tablet by mouth Daily. 90 tablet 2   • traMADol (ULTRAM) 50 MG tablet Take 50 mg by mouth 2 (Two) Times a Day.     • carvedilol (COREG) 6.25 MG tablet Take 1 tablet by mouth 2 (Two) Times a Day. (dose increased) 180 tablet 3   • pravastatin (Pravachol) 20 MG tablet Take 1 tablet by mouth Daily. 90 tablet 3     No current facility-administered medications for this visit.  "    Patient has no known allergies.    Past Medical History:   Diagnosis Date   • Abnormal PFT 2009    Mild restrictive ventilatory defect and diffusion capacity mildly enlarged   • Chest x-ray abnormality 2009    Small (R) pleural effusion   • Fibromyalgia    • GERD (gastroesophageal reflux disease)    • H/O eye surgery 2008    Cataracts removed L eye.    • H/O: hysterectomy    • History of cholecystectomy    • Hypertension    • Pericardial effusion     mild   • RA (rheumatoid arthritis) (HCC)    • Vasculitis (HCC)    • Vasculitis (HCC)      Social History     Socioeconomic History   • Marital status:    Tobacco Use   • Smoking status: Former Smoker     Quit date:      Years since quittin.9   • Smokeless tobacco: Never Used   Vaping Use   • Vaping Use: Never used   Substance and Sexual Activity   • Alcohol use: No   • Drug use: No     Family History   Problem Relation Age of Onset   • Heart attack Sister      Review of Systems   Constitutional: Positive for weight loss (1lb). Negative for decreased appetite and malaise/fatigue.   HENT: Negative.    Eyes: Negative for blurred vision.   Cardiovascular: Positive for claudication (stable ) and palpitations. Negative for chest pain, dyspnea on exertion, leg swelling and syncope.   Respiratory: Negative for shortness of breath and sleep disturbances due to breathing.    Endocrine: Negative.    Hematologic/Lymphatic: Negative for bleeding problem. Does not bruise/bleed easily.   Skin: Negative.    Musculoskeletal: Negative for falls and myalgias.   Gastrointestinal: Negative for abdominal pain, heartburn and melena.   Genitourinary: Negative for hematuria.   Neurological: Positive for dizziness. Negative for light-headedness.   Psychiatric/Behavioral: Negative for altered mental status.   Allergic/Immunologic: Negative.       Objective     /70 (BP Location: Right arm)   Pulse 84   Ht 152.4 cm (60\")   Wt 51.3 kg (113 lb)   BMI " 22.07 kg/m²     Vitals and nursing note reviewed.   Constitutional:       General: Not in acute distress.     Appearance: Well-developed. Not diaphoretic.   Eyes:      Pupils: Pupils are equal, round, and reactive to light.   HENT:      Head: Normocephalic.   Pulmonary:      Effort: Pulmonary effort is normal. No respiratory distress.      Breath sounds: Normal breath sounds.   Cardiovascular:      Normal rate. Regular rhythm.      Murmurs: There is a grade 2/6 systolic murmur at the apex.   Pulses:     Intact distal pulses.   Edema:     Peripheral edema absent.   Abdominal:      General: Bowel sounds are normal.      Palpations: Abdomen is soft.   Musculoskeletal: Normal range of motion.      Cervical back: Normal range of motion. Skin:     General: Skin is warm and dry.   Neurological:      Mental Status: Alert and oriented to person, place, and time.        Procedures          Problem List Items Addressed This Visit        Cardiac and Vasculature    HTN (hypertension)    Relevant Medications    carvedilol (COREG) 6.25 MG tablet    losartan (COZAAR) 100 MG tablet    NIFEdipine XL (PROCARDIA XL) 30 MG 24 hr tablet    furosemide (LASIX) 20 MG tablet    Carotid stenosis    Hyperlipemia    Relevant Medications    pravastatin (Pravachol) 20 MG tablet    PVD (peripheral vascular disease) with claudication (HCC) - Primary         1. HTN- mildly elevated today at 150/70, HR 84. Advised to increase carvedilol to 6.25 mg BID. Continue losartan, nifedipine, lasix 20 mg.     2. Dyslipidemia- LDL/HDL is ideal at 60/43 but with significant PAD and carotid stenosis, will recommend low dose pravastatin for pleomorphic effect. If not tolerated, advised to discontinue.     3. Carotid stenosis- US have been followed with moderate 50-69% stenosis bilaterally noted. Recent study inpatient suggested no flow in the right, <50% on the left felt to be suboptimal study. Discussed with Dr. Saldana as well as patient. She has no new or  worsening symptoms of TIA, CVA. Continue medical management for now, Plavix, aspirin, statin. Advised to monitor and report any new TIA symptoms.     4. PVD- s/p multiple procedures to legs, now managed medically. Continue cilostazol, regular walking.     5. Chronic small pericardial effusion- ?secondary to RA. Stable, unchanged.     Patient's Body mass index is 22.07 kg/m². indicating that she is within normal range (BMI 18.5-24.9). No BMI management plan needed..               Electronically signed by EVELYN Lira,  December 19, 2021 18:48 EST

## 2022-03-02 ENCOUNTER — TELEPHONE (OUTPATIENT)
Dept: CARDIOLOGY | Facility: CLINIC | Age: 87
End: 2022-03-02

## 2022-03-02 NOTE — TELEPHONE ENCOUNTER
EVELYN Wetzel (rheumatology) asking if ok for patient to start hydroxychloroquine 200 mg daily from cardiac standpoint?      Phone [phone]  Fax [phone]

## 2022-03-14 ENCOUNTER — TELEPHONE (OUTPATIENT)
Dept: CARDIOLOGY | Facility: CLINIC | Age: 87
End: 2022-03-14

## 2022-03-14 NOTE — TELEPHONE ENCOUNTER
Received voicemail from Zayra from [facility] stating that they received prescription for pletal 50 mg BID and patient is also being prescribed hydroxychloroquine 200 mg daily. They are asking if it is ok for her to take both?

## 2022-03-14 NOTE — TELEPHONE ENCOUNTER
There is no contraindication on my pharmacy resources.      We can ask patient to come in for EKG 1 week after starting.    Hydroxychloroquine alone carries very small risk of prolonging the QT interval.

## 2022-03-17 NOTE — TELEPHONE ENCOUNTER
Patient is going to come on Monday for EKG at 9:15 am. Patient states that she thinks that she started medication on Monday.

## 2022-03-21 ENCOUNTER — CLINICAL SUPPORT (OUTPATIENT)
Dept: CARDIOLOGY | Facility: CLINIC | Age: 87
End: 2022-03-21

## 2022-03-21 ENCOUNTER — TELEPHONE (OUTPATIENT)
Dept: CARDIOLOGY | Facility: CLINIC | Age: 87
End: 2022-03-21

## 2022-03-21 DIAGNOSIS — I10 ESSENTIAL HYPERTENSION: Primary | ICD-10-CM

## 2022-03-21 DIAGNOSIS — Z51.81 MEDICATION MONITORING ENCOUNTER: ICD-10-CM

## 2022-03-21 PROCEDURE — 93000 ELECTROCARDIOGRAM COMPLETE: CPT | Performed by: NURSE PRACTITIONER

## 2022-03-21 NOTE — TELEPHONE ENCOUNTER
Can we let Kylah know her EKG remained stable with hydroxychloroquine Pletal.  No prolongation of QT interval.    Her blood pressure was quite elevated at 190/80.  Please asked her to recheck after taking daily medications.    She is taking nifedipine 30 mg daily, losartan 50 mg daily, Lasix 20 mg daily and carvedilol 6.25 mg twice daily.    Thank you

## 2022-03-21 NOTE — PROGRESS NOTES
Procedure     ECG 12 Lead    Date/Time: 3/21/2022 11:39 AM  Performed by: Charissa Lee APRN  Authorized by: Charissa Lee APRN   Comparison: compared with previous ECG from 7/9/2013  Similar to previous ECG  Rhythm: sinus rhythm  Rate: normal  BPM: 76  ST Segments: ST segments normal    Clinical impression: non-specific ECG  Comments:  ms  QRS 82 ms  QTc 414 ms

## 2022-03-22 NOTE — TELEPHONE ENCOUNTER
Patient was made aware that EKG was stable, but to keep an eye on BP at home. Patient states that she will check and let us know if blood pressure readings are high. Patient was made aware for now to continue medications the same.

## 2022-05-10 ENCOUNTER — TELEPHONE (OUTPATIENT)
Dept: CARDIOLOGY | Facility: CLINIC | Age: 87
End: 2022-05-10

## 2022-05-10 NOTE — TELEPHONE ENCOUNTER
Patient came in and I thinks she has left messages.  She is having leg pain and is wanting possible ultra sound ordered.  Please call her

## 2022-05-10 NOTE — TELEPHONE ENCOUNTER
Spoke with pt, she states her legs are hot, swelling and red.  Pt was instructed to go to ER for evaluation.  Pt verbalized understanding.

## 2022-05-20 RX ORDER — PANTOPRAZOLE SODIUM 40 MG/1
TABLET, DELAYED RELEASE ORAL
Qty: 90 TABLET | Refills: 2 | Status: SHIPPED | OUTPATIENT
Start: 2022-05-20

## 2022-06-23 ENCOUNTER — OFFICE VISIT (OUTPATIENT)
Dept: CARDIOLOGY | Facility: CLINIC | Age: 87
End: 2022-06-23

## 2022-06-23 VITALS
SYSTOLIC BLOOD PRESSURE: 120 MMHG | DIASTOLIC BLOOD PRESSURE: 60 MMHG | BODY MASS INDEX: 22.38 KG/M2 | WEIGHT: 114 LBS | HEIGHT: 60 IN

## 2022-06-23 DIAGNOSIS — M79.605 PAIN IN BOTH LOWER EXTREMITIES: ICD-10-CM

## 2022-06-23 DIAGNOSIS — Z79.899 MEDICATION MANAGEMENT: ICD-10-CM

## 2022-06-23 DIAGNOSIS — E78.49 OTHER HYPERLIPIDEMIA: ICD-10-CM

## 2022-06-23 DIAGNOSIS — M79.604 PAIN IN BOTH LOWER EXTREMITIES: ICD-10-CM

## 2022-06-23 DIAGNOSIS — I10 PRIMARY HYPERTENSION: Primary | ICD-10-CM

## 2022-06-23 DIAGNOSIS — R07.89 CHEST WALL PAIN: ICD-10-CM

## 2022-06-23 DIAGNOSIS — I34.0 NON-RHEUMATIC MITRAL REGURGITATION: ICD-10-CM

## 2022-06-23 DIAGNOSIS — M79.604 BILATERAL LOWER EXTREMITY PAIN: ICD-10-CM

## 2022-06-23 DIAGNOSIS — I65.21 STENOSIS OF RIGHT CAROTID ARTERY: ICD-10-CM

## 2022-06-23 DIAGNOSIS — I73.9 PVD (PERIPHERAL VASCULAR DISEASE) WITH CLAUDICATION: ICD-10-CM

## 2022-06-23 DIAGNOSIS — M79.605 BILATERAL LOWER EXTREMITY PAIN: ICD-10-CM

## 2022-06-23 PROCEDURE — 99214 OFFICE O/P EST MOD 30 MIN: CPT | Performed by: NURSE PRACTITIONER

## 2022-06-23 NOTE — PROGRESS NOTES
"Chief Complaint   Patient presents with   • Follow-up     Cardiac management   • Leg Pain     Has leg pain ,and swelling. Says at night \"feels like they are burning up\"   • Chest Pain     Describes sharp pain to mid chest at times, but say is always \"uncomfortable    • Shortness of Breath     SOA with exertion   • LABS     Had labs April 2022 per Rheumatology and will see  tomorrow   • Med Refill     No refills needed today       Thierry Pena is a 89 y.o. female with RA, SVT s/p ablation, PVD, HTN, dyslipidemia and carotid stenosis. She underwent angioplasty and stenting to both legs by Dr. Phillips in 2014. In 2016, HCTZ stopped due to renal function. Coreg was increased. Blood pressure was not at goal and Procardia added. She followed with Dr. Phillips for dizziness, no CTA of carotids done due to high creatine, but he advised to see neurologist which she did not do. He advised to continue medical management. At February 2019 visit, she reported being started on Pletal per Dr. Grace with good benefit. She was able to walk from [employer] parking lot to back of store without stopping for rest. Her PVD was also evaluated by Dr. Bland and according to her, no intervention recommended. Carotid US 2/2020 for dizziness and showed 50% MADI and 50-69% LICA. One year fu on 8/12/21 showed no change. Echo also remained stable.     She underwent biopsy temporal artery outpatient per Dr. Frausto on 11/23//21. After the procedure, she c/o chest pain radiating to left shoulder found to have bp 190/90. She was admitted to [facility]. EKG and troponin neg. Pain resolved with nitro patch. Lexiscan negative for ischemia. Carotid US reported as no flow on the right and <50% on the left. Suboptimal study suggested.    Today she returns to the office for a follow-up visit and reports the following:    Leg pain  The patient reports that she has not seen anyone since Dr. Bruno performed her surgery. She states that she " seen Dr. Bland for a follow-up; however, he left and she never got to see him. The patient reports that her main symptoms are her legs. She states that they hurt and at night they burn. The patient reports that one foot turns real black and then it turns white. She states that she went back to bed and it was red.  She has a hard time sleeping at night due to cramping in her legs, and has to get out of bed. The patient reports that she did not go to the emergency room the other day. She states that she is doing well with her cholesterol medication.    Chest discomfort  The patient reports that she has been experiencing chest discomfort for approximately 3 weeks. She states that she can hardly do anything. The patient reports that it is hard for her to swallow. She states that at night she can not burp. The patient reports that she feels it most of the time after she lays down. She states that she is taking pantoprazole for her stomach. The patient reports that she has lost a lot of weight. She states that she is not eating as much. The patient reports that she is having nausea, vomiting, or diarrhea all the time. She states that she is real dizzy and she holds her walker or holds on to the wall. The patient reports that she sees Dr. Dos Santos to give her steroid injections for arthritis. She states she has arthritis in her shoulders and knees, and legs. The patient reports that she has knots all over. She states she has not had a chest x-ray. She denies racing or fluttering of the heart.     Cardiac History:    Past Surgical History:   Procedure Laterality Date   • ANGIOPLASTY  03/11/2014    (R) L/E Angioplasty with stent placement (Dr. Phillips)   • ANGIOPLASTY  02/21/2014    (L) Lower Extremity Angioplasty with stent placement (Dr. Phillips)   • CARDIOVASCULAR STRESS TEST  03/31/2003    DChris Myoview-(Atropine) 90% THR. Negative   • CARDIOVASCULAR STRESS TEST  06/10/2009    Stress 2 min 22 sec. 68% THR. Negative   •  CARDIOVASCULAR STRESS TEST  08/30/2010    P Myoview- Negative   • CARDIOVASCULAR STRESS TEST  09/24/2012    Stress- (Mod) 3 min 32 sec. 89% THR. 182/62. Negative   • CARDIOVASCULAR STRESS TEST  12/17/2014    Lexiscan- ([facility])- EF 65%, negative for ischemia   • CARDIOVASCULAR STRESS TEST  11/24/2021    ([facility]) Lexiscan- EF >70%, no ishcemia   • CONVERTED (HISTORICAL) HOLTER  12/10/2007    Holter- 58 BPM. One 3 beat SVT ( Dr. Isbell) Multiple artifact   • CONVERTED (HISTORICAL) HOLTER  07/09/2013    Holter- AVG HR 68 BPM   • CT ANGIO LWR EXTREMITY UNILATERAL W WO CONTRAST  07/26/2013    Severe Bilateral Distal Disease   • ECHO - CONVERTED  04/17/2008    Echo- EF 60%, mod MR, small rim of a pericardial effusion   • ECHO - CONVERTED  06/10/2009    Echo- EF >60%. moderate AR. RVSP- 42 mmHg   • ECHO - CONVERTED  09/24/2012    Echo- EF 65-70%, RVSP 47 mmHg   • ECHO - CONVERTED  12/17/2014    Echo- ([facility]) EF 60%, mild MR, mild pericardial effusion   • ECHO - CONVERTED  02/01/2017    EF > 65%,mod MR, RVSP 35-40 mmHg   • ECHO - CONVERTED  02/24/2020    EF 70%. Mild MR. RVSP- 44 mmHg. Small Pericardial Effusion   • ECHO - CONVERTED  09/21/2021    EF > 70%. Mild MR. RVSP- m49 mmHg. Small PE   • ECHO - CONVERTED  11/24/2021    ([facility]) EF 65%, LVH, sm pericardial eff, RVSP 51 mmHg   • EP STUDY  07/28/2010    EP study with ablation of SVT. (Dr. Cochran)   • OTHER SURGICAL HISTORY  07/12/2014    WOO- 0.8 & 0.9   • US CAROTID UNILATERAL  07/21/2015    Carotid- Possible 50-69% stenosis on the right. CTA recommended, creatinine of 1.4   • US CAROTID UNILATERAL  02/24/2020    Moderate stenosis bilaterally    • US CAROTID UNILATERAL  08/12/2021    Levi:  Moderate bilateral stenosis   • US CAROTID UNILATERAL  11/24/2021    No flow to MADI, <50% LICA (suboptimal study)       Current Outpatient Medications   Medication Sig Dispense Refill   • ALPRAZolam (XANAX) 0.5 MG tablet Take 0.5 mg by mouth 2 (two) times a day.     • aspirin 81 MG EC  tablet Take 81 mg by mouth daily.     • carvedilol (COREG) 6.25 MG tablet Take 1 tablet by mouth 2 (Two) Times a Day. (dose increased) 180 tablet 3   • cilostazol (PLETAL) 50 MG tablet Take 1 tablet by mouth 2 (Two) Times a Day. 180 tablet 3   • clopidogrel (PLAVIX) 75 MG tablet Take 1 tablet by mouth Daily. 90 tablet 3   • furosemide (LASIX) 20 MG tablet Take 1 tablet by mouth Daily. 90 tablet 3   • gabapentin (NEURONTIN) 300 MG capsule Take 300 mg by mouth 3 (Three) Times a Day.     • latanoprost (XALATAN) 0.005 % ophthalmic solution 1 drop Every Night.     • losartan (COZAAR) 100 MG tablet Take 0.5 tablets by mouth Daily. 45 tablet 3   • methotrexate 2.5 MG tablet Take 2.5 mg by mouth 1 (One) Time Per Week. Take 4 tablets daily on Thursday     • NIFEdipine XL (PROCARDIA XL) 30 MG 24 hr tablet Take 1 tablet by mouth Daily. 90 tablet 3   • nitroglycerin (NITROSTAT) 0.4 MG SL tablet Place 1 tablet under the tongue Every 5 (Five) Minutes As Needed for Chest Pain (x3). Take no more than 3 doses in 15 minutes. 30 tablet 1   • nortriptyline (PAMELOR) 10 MG capsule Take 10 mg by mouth every night.     • pantoprazole (PROTONIX) 40 MG EC tablet TAKE ONE TABLET BY MOUTH DAILY 90 tablet 2   • pravastatin (Pravachol) 20 MG tablet Take 1 tablet by mouth Daily. 90 tablet 3   • traMADol (ULTRAM) 50 MG tablet Take 50 mg by mouth 2 (Two) Times a Day.       No current facility-administered medications for this visit.       Patient has no known allergies.    Past Medical History:   Diagnosis Date   • Abnormal PFT 06/23/2009    Mild restrictive ventilatory defect and diffusion capacity mildly enlarged   • Chest x-ray abnormality 06/11/2009    Small (R) pleural effusion   • Fibromyalgia    • GERD (gastroesophageal reflux disease)    • H/O eye surgery 09/03/2008    Cataracts removed L eye.    • H/O: hysterectomy    • History of cholecystectomy    • Hypertension    • Pericardial effusion     mild   • RA (rheumatoid arthritis) (HCC)    •  "Vasculitis (HCC)    • Vasculitis (HCC)        Social History     Socioeconomic History   • Marital status:    Tobacco Use   • Smoking status: Former Smoker     Quit date:      Years since quittin.5   • Smokeless tobacco: Never Used   Vaping Use   • Vaping Use: Never used   Substance and Sexual Activity   • Alcohol use: No   • Drug use: No       Family History   Problem Relation Age of Onset   • Heart attack Sister        Review of Systems   Constitutional: Positive for malaise/fatigue. Negative for fever.   Eyes: Negative for visual disturbance.   Cardiovascular: Positive for chest pain and claudication. Negative for leg swelling.   Respiratory: Positive for shortness of breath. Negative for snoring.    Hematologic/Lymphatic: Negative for bleeding problem. Does not bruise/bleed easily.   Skin: Positive for color change.   Musculoskeletal: Positive for muscle cramps, muscle weakness (legs) and myalgias. Negative for falls.   Gastrointestinal: Positive for flatus and heartburn. Negative for dysphagia and melena.   Genitourinary: Negative for dysuria, hematuria and nocturia.   Neurological: Positive for loss of balance, numbness and paresthesias.   Psychiatric/Behavioral: Positive for depression. Negative for memory loss. The patient is nervous/anxious.         BP Readings from Last 5 Encounters:   22 120/60   12/15/21 150/70   21 136/68   21 142/70   20 120/70       Wt Readings from Last 5 Encounters:   22 51.7 kg (114 lb)   12/15/21 51.3 kg (113 lb)   21 51.8 kg (114 lb 3.2 oz)   21 51.8 kg (114 lb 3.2 oz)   21 53.5 kg (118 lb)       Objective      Labs;  LDL 60, HDL 43, Tri 152, , GFR 48    /60 (BP Location: Left arm)   Ht 152.4 cm (60\")   Wt 51.7 kg (114 lb)   BMI 22.26 kg/m²     Vitals and nursing note reviewed.   Constitutional:       Appearance: Not in distress. Frail.   Eyes:      Pupils: Pupils are equal, round, and reactive to " light.   HENT:      Head: Normocephalic.   Neck:      Vascular: Carotid bruit (left) present. No JVD.   Pulmonary:      Breath sounds: Normal breath sounds.   Cardiovascular:      Normal rate. Regular rhythm.      Murmurs: There is a grade 2/6 systolic murmur.   Pulses:     Decreased pulses.   Edema:     Peripheral edema absent.   Abdominal:      General: Bowel sounds are normal.      Palpations: Abdomen is soft.   Musculoskeletal: Normal range of motion.      Cervical back: Normal range of motion. Skin:     General: Skin is warm.   Neurological:      Mental Status: Alert and oriented to person, place, and time.          Procedures: none today          Assessment & Plan   Diagnoses and all orders for this visit:    1. Primary hypertension (Primary)  Comments:   Blood pressure and heart rate are well controlled.  Orders:  -     CBC (No Diff); Future  -     CBC (No Diff)    2. Other hyperlipidemia  Comments:  She will continue her current medication regimen.    Orders:  -     Comprehensive Metabolic Panel; Future  -     Lipid Panel; Future  -     Comprehensive Metabolic Panel  -     Lipid Panel    3. Stenosis of right carotid artery    4. PVD (peripheral vascular disease) with claudication (HCC)  -     CK; Future  -     CK    5. Non-rheumatic mitral regurgitation    6. Pain in both lower extremities  -     CK; Future  -     CK    7. Medication management  -     Comprehensive Metabolic Panel; Future  -     Lipid Panel; Future  -     CBC (No Diff); Future  -     CK; Future  -     CBC (No Diff)  -     Comprehensive Metabolic Panel  -     Lipid Panel  -     CK    8. Chest wall pain  -     XR Chest PA & Lateral; Future  -     XR Chest PA & Lateral    9. Bilateral lower extremity pain  Comments:       Hypertension  -Blood pressure, heart rate and rhythm are stable.  Continue current antihypertensive management  -Lab order given to assess electrolytes and renal function    Claudication pain  - If renal function stable  recommend proceeding with CTA of abdomen with runoff.  -Continue Plavix, aspirin and Pletal    Shortness of breath/chest pain  -Recent stress test reviewed which was negative for ischemia  -Chest x-ray ordered to look for any changes from prior.    Hyperlipidemia  -She will continue her current medication regimen.     GERD  -Weight is stable  -Continue dietary approaches to manage GERD symptoms.  -Continue Protonix    Further recommendations based on lab and chest x-ray results             Transcribed from ambient dictation for Linda Parrish APRN by LUNA PATEL.  06/23/22   14:46 EDT    Patient verbalized consent to the visit recording.

## 2022-06-28 DIAGNOSIS — I70.223 ATHEROSCLEROSIS OF NATIVE ARTERIES OF EXTREMITIES WITH REST PAIN, BILATERAL LEGS: ICD-10-CM

## 2022-06-28 DIAGNOSIS — Z79.899 MEDICATION MANAGEMENT: ICD-10-CM

## 2022-06-28 DIAGNOSIS — M79.604 PAIN IN BOTH LOWER EXTREMITIES: ICD-10-CM

## 2022-06-28 DIAGNOSIS — I73.9 PVD (PERIPHERAL VASCULAR DISEASE) WITH CLAUDICATION: Primary | ICD-10-CM

## 2022-06-28 DIAGNOSIS — M79.605 PAIN IN BOTH LOWER EXTREMITIES: ICD-10-CM

## 2022-08-04 DIAGNOSIS — M79.604 PAIN IN BOTH LOWER EXTREMITIES: ICD-10-CM

## 2022-08-04 DIAGNOSIS — I73.9 PVD (PERIPHERAL VASCULAR DISEASE) WITH CLAUDICATION: Primary | ICD-10-CM

## 2022-08-04 DIAGNOSIS — M79.605 PAIN IN BOTH LOWER EXTREMITIES: ICD-10-CM

## 2022-08-09 ENCOUNTER — TELEPHONE (OUTPATIENT)
Dept: CARDIOLOGY | Facility: CLINIC | Age: 87
End: 2022-08-09

## 2022-08-09 ENCOUNTER — TELEPHONE (OUTPATIENT)
Dept: CARDIAC SURGERY | Facility: CLINIC | Age: 87
End: 2022-08-09

## 2022-08-09 NOTE — TELEPHONE ENCOUNTER
lvm with patients daughter to confirm appt for 9/7/22 @ 130pm at the [place] location.    Please let patients daughter satish be aware of this onfo if she returns phone call

## 2022-09-27 ENCOUNTER — OFFICE VISIT (OUTPATIENT)
Dept: CARDIAC SURGERY | Facility: CLINIC | Age: 87
End: 2022-09-27

## 2022-09-27 VITALS
OXYGEN SATURATION: 99 % | DIASTOLIC BLOOD PRESSURE: 78 MMHG | HEIGHT: 62 IN | TEMPERATURE: 97.8 F | WEIGHT: 118.2 LBS | BODY MASS INDEX: 21.75 KG/M2 | HEART RATE: 87 BPM | SYSTOLIC BLOOD PRESSURE: 138 MMHG

## 2022-09-27 DIAGNOSIS — I73.9 PVD (PERIPHERAL VASCULAR DISEASE) WITH CLAUDICATION: Primary | ICD-10-CM

## 2022-09-27 PROCEDURE — 99204 OFFICE O/P NEW MOD 45 MIN: CPT | Performed by: THORACIC SURGERY (CARDIOTHORACIC VASCULAR SURGERY)

## 2022-09-27 NOTE — PROGRESS NOTES
09/27/2022  Patient Information  Kylah Pena                                                                                          [address] [place] [address]   3/11/1933  'PCP/Referring Physician'  Navarro Grace MD  [phone]  Linda Parrish, APRN  [phone]  Chief Complaint   Patient presents with   • Consult     N/p per Linda RIVAS for PVD. Pt states this morning that she saw Dr. Gonzalez over 15 year ago for the same dx. Pt states that she has pain, numbness, swelling at night in both feet and toes. Ulcerations from time to time, discoloration and bilateral coldness that comes and goes in both feet.        History of Present Illness: 89-year-old  female with a history of hypertension, remote tobacco abuse and peripheral vascular disease status post intervention with Dr. Phillips (data deficient) who presents with bilateral lower extremity pain.  For over 8 years, the patient notes significant bilateral (R=L) lower extremity pain that is exacerbated with ambulating short distances.  The patient had cramps in the calves when simply walking a few feet into the office today.  She has significant lower extremity pain at night and numbness in the toes.      Patient Active Problem List   Diagnosis   • HTN (hypertension)   • Carotid stenosis   • Dizziness   • Hyperlipemia   • Right-sided carotid artery disease (HCC)   • Hypercholesteremia   • Non-rheumatic mitral regurgitation   • PVD (peripheral vascular disease) with claudication (HCC)     Past Medical History:   Diagnosis Date   • Abnormal PFT 06/23/2009    Mild restrictive ventilatory defect and diffusion capacity mildly enlarged   • Chest x-ray abnormality 06/11/2009    Small (R) pleural effusion   • Fibromyalgia    • GERD (gastroesophageal reflux disease)    • H/O eye surgery 09/03/2008    Cataracts removed L eye.    • H/O: hysterectomy    • History of cholecystectomy    • Hypertension    • Pericardial effusion     mild   • RA  (rheumatoid arthritis) (HCC)    • Vasculitis (HCC)    • Vasculitis (HCC)      Past Surgical History:   Procedure Laterality Date   • ANGIOPLASTY  03/11/2014    (R) L/E Angioplasty with stent placement (Dr. Phillips)   • ANGIOPLASTY  02/21/2014    (L) Lower Extremity Angioplasty with stent placement (Dr. Phillips)   • CARDIOVASCULAR STRESS TEST  03/31/2003    D. Myoview-(Atropine) 90% THR. Negative   • CARDIOVASCULAR STRESS TEST  06/10/2009    Stress 2 min 22 sec. 68% THR. Negative   • CARDIOVASCULAR STRESS TEST  08/30/2010    P Myoview- Negative   • CARDIOVASCULAR STRESS TEST  09/24/2012    Stress- (Mod) 3 min 32 sec. 89% THR. 182/62. Negative   • CARDIOVASCULAR STRESS TEST  12/17/2014    Lexiscan- ([facility])- EF 65%, negative for ischemia   • CARDIOVASCULAR STRESS TEST  11/24/2021    ([facility]) Lexiscan- EF >70%, no ishcemia   • CONVERTED (HISTORICAL) HOLTER  12/10/2007    Holter- 58 BPM. One 3 beat SVT ( Dr. Isbell) Multiple artifact   • CONVERTED (HISTORICAL) HOLTER  07/09/2013    Holter- AVG HR 68 BPM   • CT ANGIO LWR EXTREMITY UNILATERAL W WO CONTRAST  07/26/2013    Severe Bilateral Distal Disease   • ECHO - CONVERTED  04/17/2008    Echo- EF 60%, mod MR, small rim of a pericardial effusion   • ECHO - CONVERTED  06/10/2009    Echo- EF >60%. moderate AR. RVSP- 42 mmHg   • ECHO - CONVERTED  09/24/2012    Echo- EF 65-70%, RVSP 47 mmHg   • ECHO - CONVERTED  12/17/2014    Echo- ([facility]) EF 60%, mild MR, mild pericardial effusion   • ECHO - CONVERTED  02/01/2017    EF > 65%,mod MR, RVSP 35-40 mmHg   • ECHO - CONVERTED  02/24/2020    EF 70%. Mild MR. RVSP- 44 mmHg. Small Pericardial Effusion   • ECHO - CONVERTED  09/21/2021    EF > 70%. Mild MR. RVSP- m49 mmHg. Small PE   • ECHO - CONVERTED  11/24/2021    ([facility]) EF 65%, LVH, sm pericardial eff, RVSP 51 mmHg   • EP STUDY  07/28/2010    EP study with ablation of SVT. (Dr. Cochran)   • OTHER SURGICAL HISTORY  07/12/2014    WOO- 0.8 & 0.9   • US CAROTID UNILATERAL  07/21/2015    Carotid-  Possible 50-69% stenosis on the right. CTA recommended, creatinine of 1.4   • US CAROTID UNILATERAL  02/24/2020    Moderate stenosis bilaterally    • US CAROTID UNILATERAL  08/12/2021    Levi:  Moderate bilateral stenosis   • US CAROTID UNILATERAL  11/24/2021    No flow to MADI, <50% LICA (suboptimal study)       Current Outpatient Medications:   •  ALPRAZolam (XANAX) 0.5 MG tablet, Take 0.5 mg by mouth 2 (two) times a day., Disp: , Rfl:   •  aspirin 81 MG EC tablet, Take 81 mg by mouth daily., Disp: , Rfl:   •  carvedilol (COREG) 6.25 MG tablet, Take 1 tablet by mouth 2 (Two) Times a Day. (dose increased), Disp: 180 tablet, Rfl: 3  •  cilostazol (PLETAL) 50 MG tablet, Take 1 tablet by mouth 2 (Two) Times a Day., Disp: 180 tablet, Rfl: 3  •  clopidogrel (PLAVIX) 75 MG tablet, Take 1 tablet by mouth Daily., Disp: 90 tablet, Rfl: 3  •  furosemide (LASIX) 20 MG tablet, Take 1 tablet by mouth Daily., Disp: 90 tablet, Rfl: 3  •  gabapentin (NEURONTIN) 300 MG capsule, Take 300 mg by mouth 3 (Three) Times a Day., Disp: , Rfl:   •  latanoprost (XALATAN) 0.005 % ophthalmic solution, 1 drop Every Night., Disp: , Rfl:   •  losartan (COZAAR) 100 MG tablet, Take 0.5 tablets by mouth Daily., Disp: 45 tablet, Rfl: 3  •  methotrexate 2.5 MG tablet, Take 2.5 mg by mouth 1 (One) Time Per Week. Take 4 tablets daily on Thursday, Disp: , Rfl:   •  NIFEdipine XL (PROCARDIA XL) 30 MG 24 hr tablet, Take 1 tablet by mouth Daily., Disp: 90 tablet, Rfl: 3  •  nitroglycerin (NITROSTAT) 0.4 MG SL tablet, Place 1 tablet under the tongue Every 5 (Five) Minutes As Needed for Chest Pain (x3). Take no more than 3 doses in 15 minutes., Disp: 30 tablet, Rfl: 1  •  nortriptyline (PAMELOR) 10 MG capsule, Take 10 mg by mouth every night., Disp: , Rfl:   •  pantoprazole (PROTONIX) 40 MG EC tablet, TAKE ONE TABLET BY MOUTH DAILY, Disp: 90 tablet, Rfl: 2  •  pravastatin (Pravachol) 20 MG tablet, Take 1 tablet by mouth Daily., Disp: 90 tablet, Rfl: 3  •   traMADol (ULTRAM) 50 MG tablet, Take 50 mg by mouth 2 (Two) Times a Day., Disp: , Rfl:   No Known Allergies  Social History     Socioeconomic History   • Marital status:    • Number of children: 3   Tobacco Use   • Smoking status: Former Smoker     Years: 5.00     Quit date:      Years since quittin.7   • Smokeless tobacco: Never Used   • Tobacco comment: pt  states that she only smoked a few cigarettes a day off an on for a few years   Vaping Use   • Vaping Use: Never used   Substance and Sexual Activity   • Alcohol use: No   • Drug use: No   • Sexual activity: Defer     Family History   Problem Relation Age of Onset   • Heart attack Sister      Review of Systems   Constitutional: Negative for chills, decreased appetite, fever, malaise/fatigue, weight gain and weight loss.   HENT: Negative for hearing loss.    Cardiovascular: Positive for chest pain (pain from time to time) and leg swelling. Negative for claudication, cyanosis, dyspnea on exertion, irregular heartbeat, near-syncope, orthopnea, palpitations, paroxysmal nocturnal dyspnea and syncope.   Endocrine: Negative for polydipsia, polyphagia and polyuria.   Hematologic/Lymphatic: Negative for adenopathy. Bruises/bleeds easily.   Musculoskeletal: Positive for joint pain, joint swelling and muscle weakness. Negative for arthritis, falls, gout and myalgias.   Gastrointestinal: Negative for abdominal pain, anorexia, dysphagia, heartburn, nausea and vomiting.   Genitourinary: Negative for dysuria and hematuria.   Neurological: Positive for numbness (bilateral toes and feet). Negative for dizziness, focal weakness, headaches, loss of balance, seizures, vertigo and weakness.   Psychiatric/Behavioral: Negative for altered mental status, depression, substance abuse and suicidal ideas. The patient is not nervous/anxious.    Allergic/Immunologic: Positive for environmental allergies. Negative for HIV exposure and persistent infections.     Vitals:     "09/27/22 0941   BP: 138/78   Pulse: 87   Temp: 97.8 °F (36.6 °C)   SpO2: 99%   Weight: 53.6 kg (118 lb 3.2 oz)   Height: 157.5 cm (62\")      Physical Exam  Vitals reviewed.   Constitutional:       General: She is not in acute distress.     Comments:  female who appears stated age and is present with her daughter.  The patient is very hard of hearing.   HENT:      Head: Normocephalic and atraumatic.      Nose: Nose normal.   Eyes:      General: No scleral icterus.  Cardiovascular:      Rate and Rhythm: Normal rate and regular rhythm.      Pulses:           Femoral pulses are 2+ on the right side and 2+ on the left side.       Popliteal pulses are 0 on the right side and 0 on the left side.        Dorsalis pedis pulses are detected w/ Doppler on the right side and detected w/ Doppler on the left side.        Posterior tibial pulses are detected w/ Doppler on the right side and detected w/ Doppler on the left side.      Heart sounds: No murmur heard.    No friction rub. No gallop.      Comments: Bilateral strong DP biphasic doppler signals, bilateral posterior tibial doppler signals are present.    Pulmonary:      Effort: Pulmonary effort is normal. No respiratory distress.      Breath sounds: Normal breath sounds.   Abdominal:      General: There is no distension.      Palpations: Abdomen is soft. There is no mass.      Tenderness: There is no abdominal tenderness. There is no guarding or rebound.   Musculoskeletal:         General: Normal range of motion.      Cervical back: Neck supple.      Right lower leg: No edema.      Left lower leg: No edema.   Skin:     General: Skin is warm and dry.      Comments: No pedal erythema or ulcerations   Neurological:      General: No focal deficit present.      Mental Status: She is alert and oriented to person, place, and time.      Comments: Intact pedal motor function and sensation   Psychiatric:         Mood and Affect: Mood normal.         Behavior: Behavior normal. "         Thought Content: Thought content normal.         Judgment: Judgment normal.         The ROS, past medical history, surgical history, family history, social history and vitals were reviewed by myself and corrected as needed.      Labs/Imaging:  -CTA of the aorta with lower extremity runoff performed 7/29/2022, per report (images unavailable from [facility]), demonstrates 60% stenosis of the celiac artery, 80 to 90% stenosis of the superior mesenteric artery, patent inferior mesenteric artery, 70 to 80% right renal artery stenosis and 90% left renal artery stenosis.  70 to 80% right superficial femoral artery stenosis, high-grade stenosis of the right popliteal artery measuring 80 to 90%.  Diffuse disease is present in the trifurcation vessels with single-vessel runoff via the anterior tibial artery.  The left lower extremity has 50% superficial femoral artery stenosis and 60 to 70% left popliteal artery stenosis.  The diffuse trifurcation stenosis is present with two-vessel runoff to the ankle.    Assessment/Plan:  89-year-old  female with a history of hypertension, remote tobacco abuse and peripheral vascular disease status post intervention with Dr. Phillips (data deficient) who presents with bilateral lower extremity pain.  The patient has bilateral lower extremity claudication, nocturnal ischemic rest pain and paresthesias secondary to peripheral vascular disease.  Her CTA of the aorta with lower extremity runoff from [facility] will need to be obtained for review.  Additionally, operative notes from [facility] performed by Dr. Phillips will need to be obtained for review.  Assuming these images are satisfactory, I discussed with the patient performing an aortogram with lower extremity runoff with possible angioplasty/stent.  The risks and benefits of the procedure were discussed with the patient including pain, bleeding, infection, loss of limb, myocardial infarction and death.   The patient understood these risks and wished to proceed with the above plan.    Patient Active Problem List   Diagnosis   • HTN (hypertension)   • Carotid stenosis   • Dizziness   • Hyperlipemia   • Right-sided carotid artery disease (HCC)   • Hypercholesteremia   • Non-rheumatic mitral regurgitation   • PVD (peripheral vascular disease) with claudication (HCC)

## 2022-10-11 ENCOUNTER — TELEPHONE (OUTPATIENT)
Dept: CARDIOLOGY | Facility: CLINIC | Age: 87
End: 2022-10-11

## 2022-12-21 RX ORDER — PRAVASTATIN SODIUM 20 MG
TABLET ORAL
Qty: 90 TABLET | Refills: 3 | Status: SHIPPED | OUTPATIENT
Start: 2022-12-21 | End: 2023-01-05 | Stop reason: SDUPTHER

## 2022-12-21 RX ORDER — CARVEDILOL 6.25 MG/1
TABLET ORAL
Qty: 180 TABLET | Refills: 3 | Status: SHIPPED | OUTPATIENT
Start: 2022-12-21 | End: 2023-01-05 | Stop reason: SDUPTHER

## 2022-12-21 RX ORDER — NIFEDIPINE 30 MG/1
TABLET, EXTENDED RELEASE ORAL
Qty: 90 TABLET | Refills: 3 | Status: SHIPPED | OUTPATIENT
Start: 2022-12-21 | End: 2023-01-05 | Stop reason: SDUPTHER

## 2022-12-21 RX ORDER — CILOSTAZOL 50 MG/1
TABLET ORAL
Qty: 180 TABLET | Refills: 3 | Status: SHIPPED | OUTPATIENT
Start: 2022-12-21 | End: 2023-01-05 | Stop reason: SDUPTHER

## 2023-01-05 ENCOUNTER — OFFICE VISIT (OUTPATIENT)
Dept: CARDIOLOGY | Facility: CLINIC | Age: 88
End: 2023-01-05
Payer: MEDICARE

## 2023-01-05 VITALS
SYSTOLIC BLOOD PRESSURE: 112 MMHG | BODY MASS INDEX: 20.68 KG/M2 | DIASTOLIC BLOOD PRESSURE: 60 MMHG | HEART RATE: 70 BPM | HEIGHT: 62 IN | WEIGHT: 112.4 LBS

## 2023-01-05 DIAGNOSIS — M79.605 PAIN IN BOTH LOWER EXTREMITIES: ICD-10-CM

## 2023-01-05 DIAGNOSIS — E78.00 HYPERCHOLESTEREMIA: ICD-10-CM

## 2023-01-05 DIAGNOSIS — I65.21 STENOSIS OF RIGHT CAROTID ARTERY: ICD-10-CM

## 2023-01-05 DIAGNOSIS — I10 PRIMARY HYPERTENSION: ICD-10-CM

## 2023-01-05 DIAGNOSIS — M79.604 PAIN IN BOTH LOWER EXTREMITIES: ICD-10-CM

## 2023-01-05 DIAGNOSIS — I73.9 PVD (PERIPHERAL VASCULAR DISEASE) WITH CLAUDICATION: Primary | ICD-10-CM

## 2023-01-05 PROCEDURE — 99214 OFFICE O/P EST MOD 30 MIN: CPT | Performed by: NURSE PRACTITIONER

## 2023-01-05 RX ORDER — LOSARTAN POTASSIUM 100 MG/1
50 TABLET ORAL DAILY
Qty: 15 TABLET | Refills: 8 | Status: SHIPPED | OUTPATIENT
Start: 2023-01-05

## 2023-01-05 RX ORDER — CARVEDILOL 6.25 MG/1
6.25 TABLET ORAL 2 TIMES DAILY
Qty: 60 TABLET | Refills: 8 | Status: SHIPPED | OUTPATIENT
Start: 2023-01-05

## 2023-01-05 RX ORDER — CLOPIDOGREL BISULFATE 75 MG/1
75 TABLET ORAL DAILY
Qty: 30 TABLET | Refills: 8 | Status: SHIPPED | OUTPATIENT
Start: 2023-01-05

## 2023-01-05 RX ORDER — NIFEDIPINE 30 MG/1
30 TABLET, EXTENDED RELEASE ORAL DAILY
Qty: 30 TABLET | Refills: 8 | Status: SHIPPED | OUTPATIENT
Start: 2023-01-05

## 2023-01-05 RX ORDER — CILOSTAZOL 50 MG/1
50 TABLET ORAL 2 TIMES DAILY
Qty: 60 TABLET | Refills: 8 | Status: SHIPPED | OUTPATIENT
Start: 2023-01-05

## 2023-01-05 RX ORDER — PRAVASTATIN SODIUM 20 MG
20 TABLET ORAL DAILY
Qty: 30 TABLET | Refills: 8 | Status: SHIPPED | OUTPATIENT
Start: 2023-01-05

## 2023-01-05 RX ORDER — FUROSEMIDE 20 MG/1
20 TABLET ORAL DAILY
Qty: 30 TABLET | Refills: 8 | Status: SHIPPED | OUTPATIENT
Start: 2023-01-05

## 2023-01-05 NOTE — LETTER
January 6, 2023     Navarro Grace MD  [address] [place] [id number]    Patient: Kylah Pena   YOB: 1933   Date of Visit: 1/5/2023       Dear Navarro Grace MD    Kylah Pena was in my office today. Below is a copy of my note.    If you have questions, please do not hesitate to call me. I look forward to following Kylah along with you.         Sincerely,        EVELYN Lira        CC: No Recipients    Chief Complaint   Patient presents with   • Follow-up     Pt is here for cardiac follow up.  Pt states she has been having pain in her chest over the past few days.  She describes it as soreness mid chest.  She also reports SOB.  Pt states she does have dizziness when she first gets up.  She denies palpitations.  Pt does take a daily ASA.   • Med Refill     Pt request 30 day refills to be sent to [facility].  Medications were verified by the pt.     • Lab Work     Pt states their last labs were about a month ago with Dr Lafleur.       Subjective       Kylah Pena is a 89 y.o. female with RA, SVT s/p ablation, PVD, HTN, dyslipidemia and carotid stenosis. She underwent angioplasty and stenting to both legs by Dr. Phillips in 2014. In 2016, HCTZ stopped due to renal function. Coreg increased. Blood pressure was not at goal and Procardia added. In February 2019, Pletal added with positive response. Carotid US 2/2020 for dizziness and showed 50% MADI and 50-69% LICA. One year fu on 8/12/21 showed no change. Echo also remained stable.      She underwent biopsy temporal artery outpatient per Dr. Frausto on 11/23//21. After the procedure, she c/o chest pain radiating to left shoulder found to have bp 190/90. She was admitted to [facility]. EKG and troponin neg. Pain resolved with nitro patch. Lexiscan negative for ischemia. Carotid US reported as no flow on the right and <50% on the left. Suboptimal study suggested.    She returns today for follow up. She was referred to Dr. Flores to  evaluate PAD, bilateral leg pain, abnormal CTA showing multiple areas of stenosis. According to her, was advised nothing could be done. Per Dr. Flores note, he wanted to review previous surgical reports before making recommendations. She was in [facility] 2 months ago with N/V/D. CC is persistent, severe bilateral leg pain.       Cardiac History:    Past Surgical History:   Procedure Laterality Date   • ANGIOPLASTY  03/11/2014    (R) L/E Angioplasty with stent placement (Dr. Phillips)   • ANGIOPLASTY  02/21/2014    (L) Lower Extremity Angioplasty with stent placement (Dr. Phillips)   • CARDIOVASCULAR STRESS TEST  03/31/2003    D. Myoview-(Atropine) 90% THR. Negative   • CARDIOVASCULAR STRESS TEST  06/10/2009    Stress 2 min 22 sec. 68% THR. Negative   • CARDIOVASCULAR STRESS TEST  08/30/2010    P Myoview- Negative   • CARDIOVASCULAR STRESS TEST  09/24/2012    Stress- (Mod) 3 min 32 sec. 89% THR. 182/62. Negative   • CARDIOVASCULAR STRESS TEST  12/17/2014    Lexiscan- ([facility])- EF 65%, negative for ischemia   • CARDIOVASCULAR STRESS TEST  11/24/2021    ([facility]) Lexiscan- EF >70%, no ishcemia   • CONVERTED (HISTORICAL) HOLTER  12/10/2007    Holter- 58 BPM. One 3 beat SVT ( Dr. Isbell) Multiple artifact   • CONVERTED (HISTORICAL) HOLTER  07/09/2013    Holter- AVG HR 68 BPM   • CT ANGIO LWR EXTREMITY UNILATERAL W WO CONTRAST  07/26/2013    Severe Bilateral Distal Disease   • ECHO - CONVERTED  04/17/2008    Echo- EF 60%, mod MR, small rim of a pericardial effusion   • ECHO - CONVERTED  06/10/2009    Echo- EF >60%. moderate AR. RVSP- 42 mmHg   • ECHO - CONVERTED  09/24/2012    Echo- EF 65-70%, RVSP 47 mmHg   • ECHO - CONVERTED  12/17/2014    Echo- ([facility]) EF 60%, mild MR, mild pericardial effusion   • ECHO - CONVERTED  02/01/2017    EF > 65%,mod MR, RVSP 35-40 mmHg   • ECHO - CONVERTED  02/24/2020    EF 70%. Mild MR. RVSP- 44 mmHg. Small Pericardial Effusion   • ECHO - CONVERTED  09/21/2021    EF > 70%. Mild MR. RVSP- m49 mmHg. Small PE    • ECHO - CONVERTED  11/24/2021    ([facility]) EF 65%, LVH, sm pericardial eff, RVSP 51 mmHg   • EP STUDY  07/28/2010    EP study with ablation of SVT. (Dr. Cochran)   • OTHER SURGICAL HISTORY  07/12/2014    WOO- 0.8 & 0.9   • US CAROTID UNILATERAL  07/21/2015    Carotid- Possible 50-69% stenosis on the right. CTA recommended, creatinine of 1.4   • US CAROTID UNILATERAL  02/24/2020    Moderate stenosis bilaterally    • US CAROTID UNILATERAL  08/12/2021    Levi:  Moderate bilateral stenosis   • US CAROTID UNILATERAL  11/24/2021    No flow to MADI, <50% LICA (suboptimal study)     Current Outpatient Medications   Medication Sig Dispense Refill   • ALPRAZolam (XANAX) 0.5 MG tablet Take 0.5 mg by mouth 2 (two) times a day.     • aspirin 81 MG EC tablet Take 81 mg by mouth daily.     • carvedilol (COREG) 6.25 MG tablet Take 1 tablet by mouth 2 (Two) Times a Day. 60 tablet 8   • cilostazol (PLETAL) 50 MG tablet Take 1 tablet by mouth 2 (Two) Times a Day. 60 tablet 8   • clopidogrel (PLAVIX) 75 MG tablet Take 1 tablet by mouth Daily. 30 tablet 8   • furosemide (LASIX) 20 MG tablet Take 1 tablet by mouth Daily. 30 tablet 8   • gabapentin (NEURONTIN) 300 MG capsule Take 300 mg by mouth 3 (Three) Times a Day.     • latanoprost (XALATAN) 0.005 % ophthalmic solution 1 drop Every Night.     • losartan (COZAAR) 100 MG tablet Take 0.5 tablets by mouth Daily. 15 tablet 8   • methotrexate 2.5 MG tablet Take 2.5 mg by mouth 1 (One) Time Per Week. Take 4 tablets daily on Thursday     • NIFEdipine XL (PROCARDIA XL) 30 MG 24 hr tablet Take 1 tablet by mouth Daily. 30 tablet 8   • nitroglycerin (NITROSTAT) 0.4 MG SL tablet Place 1 tablet under the tongue Every 5 (Five) Minutes As Needed for Chest Pain (x3). Take no more than 3 doses in 15 minutes. 30 tablet 1   • nortriptyline (PAMELOR) 10 MG capsule Take 10 mg by mouth every night.     • pantoprazole (PROTONIX) 40 MG EC tablet TAKE ONE TABLET BY MOUTH DAILY 90 tablet 2   • pravastatin  (PRAVACHOL) 20 MG tablet Take 1 tablet by mouth Daily. 30 tablet 8   • traMADol (ULTRAM) 50 MG tablet Take 50 mg by mouth 2 (Two) Times a Day.       No current facility-administered medications for this visit.     Patient has no known allergies.    Past Medical History:   Diagnosis Date   • Abnormal PFT 2009    Mild restrictive ventilatory defect and diffusion capacity mildly enlarged   • Chest x-ray abnormality 2009    Small (R) pleural effusion   • Fibromyalgia    • GERD (gastroesophageal reflux disease)    • H/O eye surgery 2008    Cataracts removed L eye.    • H/O: hysterectomy    • History of cholecystectomy    • Hypertension    • Pericardial effusion     mild   • RA (rheumatoid arthritis) (HCC)    • Vasculitis (HCC)    • Vasculitis (HCC)      Social History     Socioeconomic History   • Marital status:    • Number of children: 3   Tobacco Use   • Smoking status: Former     Years: 5.00     Types: Cigarettes     Quit date:      Years since quittin.0   • Smokeless tobacco: Never   • Tobacco comments:     pt  states that she only smoked a few cigarettes a day off an on for a few years   Vaping Use   • Vaping Use: Never used   Substance and Sexual Activity   • Alcohol use: No   • Drug use: No   • Sexual activity: Defer     Family History   Problem Relation Age of Onset   • Heart attack Sister      Review of Systems   Constitutional: Positive for weight loss (-6). Negative for decreased appetite and malaise/fatigue.   HENT: Negative.    Eyes: Negative for blurred vision.   Cardiovascular: Positive for claudication. Negative for chest pain, dyspnea on exertion, leg swelling, palpitations and syncope.   Respiratory: Negative for shortness of breath and sleep disturbances due to breathing.    Endocrine: Negative.    Hematologic/Lymphatic: Negative for bleeding problem. Does not bruise/bleed easily.   Skin: Negative.    Musculoskeletal: Negative for falls and myalgias.    Gastrointestinal: Negative for abdominal pain, heartburn and melena.   Genitourinary: Negative for hematuria.   Neurological: Negative for dizziness and light-headedness.   Psychiatric/Behavioral: Negative for altered mental status.   Allergic/Immunologic: Negative.       Objective      /60 (BP Location: Left arm, Patient Position: Sitting)   Pulse 70   Ht 157.5 cm (62\")   Wt 51 kg (112 lb 6.4 oz)   BMI 20.56 kg/m²     Vitals and nursing note reviewed.   Constitutional:       General: Not in acute distress.     Appearance: Well-developed. Not diaphoretic.   Eyes:      Pupils: Pupils are equal, round, and reactive to light.   HENT:      Head: Normocephalic.   Pulmonary:      Effort: Pulmonary effort is normal. No respiratory distress.      Breath sounds: Normal breath sounds.   Cardiovascular:      Normal rate. Regular rhythm.   Pulses:     Decreased pulses.   Edema:     Pretibial: bilateral trace edema of the pretibial area.     Ankle: bilateral trace edema of the ankle.  Abdominal:      General: Bowel sounds are normal.      Palpations: Abdomen is soft.   Musculoskeletal: Normal range of motion.      Cervical back: Normal range of motion. Skin:     General: Skin is warm and dry.   Neurological:      Mental Status: Alert and oriented to person, place, and time.        Procedures         Problem List Items Addressed This Visit        Cardiac and Vasculature    HTN (hypertension)    Relevant Medications    carvedilol (COREG) 6.25 MG tablet    losartan (COZAAR) 100 MG tablet    NIFEdipine XL (PROCARDIA XL) 30 MG 24 hr tablet    furosemide (LASIX) 20 MG tablet    Other Relevant Orders    Ambulatory Referral to Cardiology (Completed)    Carotid stenosis    Hypercholesteremia    Relevant Medications    pravastatin (PRAVACHOL) 20 MG tablet    PVD (peripheral vascular disease) with claudication (HCC) - Primary    Relevant Orders    Ambulatory Referral to Cardiology (Completed)   Other Visit Diagnoses     Pain in  both lower extremities        Relevant Orders    Ambulatory Referral to Cardiology (Completed)         1. HTN- well controlled with current medication combination. Continue same.     2. PAD/claudication pain- DP present but weak, feet are warm, reddened. CTA reviewed with multiple areas of stenosis. Medications maximized, CK normal. She would like to seek another opinion regarding the leg pain. Referral made to Dr. Herr.     3. Hyperlipidemia- 6/27/22- LDL 25, HDL 63, continue pravastatin.     4. Carotid stenosis- no TIA, moderate per last carotid US. Continue antiplatelets, statin. Will repeat US next fu.     BMI is within normal parameters. No other follow-up for BMI required.           Electronically signed by EVELYN Lira,  January 6, 2023 17:05 EST

## 2023-01-05 NOTE — PROGRESS NOTES
Chief Complaint   Patient presents with   • Follow-up     Pt is here for cardiac follow up.  Pt states she has been having pain in her chest over the past few days.  She describes it as soreness mid chest.  She also reports SOB.  Pt states she does have dizziness when she first gets up.  She denies palpitations.  Pt does take a daily ASA.   • Med Refill     Pt request 30 day refills to be sent to [facility].  Medications were verified by the pt.     • Lab Work     Pt states their last labs were about a month ago with Dr Lafleur.       Thierry Pena is a 89 y.o. female with RA, SVT s/p ablation, PVD, HTN, dyslipidemia and carotid stenosis. She underwent angioplasty and stenting to both legs by Dr. Phillips in 2014. In 2016, HCTZ stopped due to renal function. Coreg increased. Blood pressure was not at goal and Procardia added. In February 2019, Pletal added with positive response. Carotid US 2/2020 for dizziness and showed 50% MADI and 50-69% LICA. One year fu on 8/12/21 showed no change. Echo also remained stable.      She underwent biopsy temporal artery outpatient per Dr. Frausto on 11/23//21. After the procedure, she c/o chest pain radiating to left shoulder found to have bp 190/90. She was admitted to [facility]. EKG and troponin neg. Pain resolved with nitro patch. Lexiscan negative for ischemia. Carotid US reported as no flow on the right and <50% on the left. Suboptimal study suggested.    She returns today for follow up. She was referred to Dr. Flores to evaluate PAD, bilateral leg pain, abnormal CTA showing multiple areas of stenosis. According to her, was advised nothing could be done. Per Dr. Flores note, he wanted to review previous surgical reports before making recommendations. She was in [facility] 2 months ago with N/V/D. CC is persistent, severe bilateral leg pain.        Cardiac History:    Past Surgical History:   Procedure Laterality Date   • ANGIOPLASTY  03/11/2014    (R) L/E Angioplasty with  stent placement (Dr. Phillips)   • ANGIOPLASTY  02/21/2014    (L) Lower Extremity Angioplasty with stent placement (Dr. Phillips)   • CARDIOVASCULAR STRESS TEST  03/31/2003    D. Myoview-(Atropine) 90% THR. Negative   • CARDIOVASCULAR STRESS TEST  06/10/2009    Stress 2 min 22 sec. 68% THR. Negative   • CARDIOVASCULAR STRESS TEST  08/30/2010    P Myoview- Negative   • CARDIOVASCULAR STRESS TEST  09/24/2012    Stress- (Mod) 3 min 32 sec. 89% THR. 182/62. Negative   • CARDIOVASCULAR STRESS TEST  12/17/2014    Lexiscan- ([facility])- EF 65%, negative for ischemia   • CARDIOVASCULAR STRESS TEST  11/24/2021    ([facility]) Lexiscan- EF >70%, no ishcemia   • CONVERTED (HISTORICAL) HOLTER  12/10/2007    Holter- 58 BPM. One 3 beat SVT ( Dr. Isbell) Multiple artifact   • CONVERTED (HISTORICAL) HOLTER  07/09/2013    Holter- AVG HR 68 BPM   • CT ANGIO LWR EXTREMITY UNILATERAL W WO CONTRAST  07/26/2013    Severe Bilateral Distal Disease   • ECHO - CONVERTED  04/17/2008    Echo- EF 60%, mod MR, small rim of a pericardial effusion   • ECHO - CONVERTED  06/10/2009    Echo- EF >60%. moderate AR. RVSP- 42 mmHg   • ECHO - CONVERTED  09/24/2012    Echo- EF 65-70%, RVSP 47 mmHg   • ECHO - CONVERTED  12/17/2014    Echo- ([facility]) EF 60%, mild MR, mild pericardial effusion   • ECHO - CONVERTED  02/01/2017    EF > 65%,mod MR, RVSP 35-40 mmHg   • ECHO - CONVERTED  02/24/2020    EF 70%. Mild MR. RVSP- 44 mmHg. Small Pericardial Effusion   • ECHO - CONVERTED  09/21/2021    EF > 70%. Mild MR. RVSP- m49 mmHg. Small PE   • ECHO - CONVERTED  11/24/2021    ([facility]) EF 65%, LVH, sm pericardial eff, RVSP 51 mmHg   • EP STUDY  07/28/2010    EP study with ablation of SVT. (Dr. Cochran)   • OTHER SURGICAL HISTORY  07/12/2014    WOO- 0.8 & 0.9   • US CAROTID UNILATERAL  07/21/2015    Carotid- Possible 50-69% stenosis on the right. CTA recommended, creatinine of 1.4   • US CAROTID UNILATERAL  02/24/2020    Moderate stenosis bilaterally    • US CAROTID UNILATERAL  08/12/2021     Levi:  Moderate bilateral stenosis   • US CAROTID UNILATERAL  11/24/2021    No flow to MADI, <50% LICA (suboptimal study)     Current Outpatient Medications   Medication Sig Dispense Refill   • ALPRAZolam (XANAX) 0.5 MG tablet Take 0.5 mg by mouth 2 (two) times a day.     • aspirin 81 MG EC tablet Take 81 mg by mouth daily.     • carvedilol (COREG) 6.25 MG tablet Take 1 tablet by mouth 2 (Two) Times a Day. 60 tablet 8   • cilostazol (PLETAL) 50 MG tablet Take 1 tablet by mouth 2 (Two) Times a Day. 60 tablet 8   • clopidogrel (PLAVIX) 75 MG tablet Take 1 tablet by mouth Daily. 30 tablet 8   • furosemide (LASIX) 20 MG tablet Take 1 tablet by mouth Daily. 30 tablet 8   • gabapentin (NEURONTIN) 300 MG capsule Take 300 mg by mouth 3 (Three) Times a Day.     • latanoprost (XALATAN) 0.005 % ophthalmic solution 1 drop Every Night.     • losartan (COZAAR) 100 MG tablet Take 0.5 tablets by mouth Daily. 15 tablet 8   • methotrexate 2.5 MG tablet Take 2.5 mg by mouth 1 (One) Time Per Week. Take 4 tablets daily on Thursday     • NIFEdipine XL (PROCARDIA XL) 30 MG 24 hr tablet Take 1 tablet by mouth Daily. 30 tablet 8   • nitroglycerin (NITROSTAT) 0.4 MG SL tablet Place 1 tablet under the tongue Every 5 (Five) Minutes As Needed for Chest Pain (x3). Take no more than 3 doses in 15 minutes. 30 tablet 1   • nortriptyline (PAMELOR) 10 MG capsule Take 10 mg by mouth every night.     • pantoprazole (PROTONIX) 40 MG EC tablet TAKE ONE TABLET BY MOUTH DAILY 90 tablet 2   • pravastatin (PRAVACHOL) 20 MG tablet Take 1 tablet by mouth Daily. 30 tablet 8   • traMADol (ULTRAM) 50 MG tablet Take 50 mg by mouth 2 (Two) Times a Day.       No current facility-administered medications for this visit.     Patient has no known allergies.    Past Medical History:   Diagnosis Date   • Abnormal PFT 06/23/2009    Mild restrictive ventilatory defect and diffusion capacity mildly enlarged   • Chest x-ray abnormality 06/11/2009    Small (R) pleural  effusion   • Fibromyalgia    • GERD (gastroesophageal reflux disease)    • H/O eye surgery 2008    Cataracts removed L eye.    • H/O: hysterectomy    • History of cholecystectomy    • Hypertension    • Pericardial effusion     mild   • RA (rheumatoid arthritis) (HCC)    • Vasculitis (HCC)    • Vasculitis (HCC)      Social History     Socioeconomic History   • Marital status:    • Number of children: 3   Tobacco Use   • Smoking status: Former     Years: 5.00     Types: Cigarettes     Quit date:      Years since quittin.0   • Smokeless tobacco: Never   • Tobacco comments:     pt  states that she only smoked a few cigarettes a day off an on for a few years   Vaping Use   • Vaping Use: Never used   Substance and Sexual Activity   • Alcohol use: No   • Drug use: No   • Sexual activity: Defer     Family History   Problem Relation Age of Onset   • Heart attack Sister      Review of Systems   Constitutional: Positive for weight loss (-6). Negative for decreased appetite and malaise/fatigue.   HENT: Negative.    Eyes: Negative for blurred vision.   Cardiovascular: Positive for claudication. Negative for chest pain, dyspnea on exertion, leg swelling, palpitations and syncope.   Respiratory: Negative for shortness of breath and sleep disturbances due to breathing.    Endocrine: Negative.    Hematologic/Lymphatic: Negative for bleeding problem. Does not bruise/bleed easily.   Skin: Negative.    Musculoskeletal: Negative for falls and myalgias.   Gastrointestinal: Negative for abdominal pain, heartburn and melena.   Genitourinary: Negative for hematuria.   Neurological: Negative for dizziness and light-headedness.   Psychiatric/Behavioral: Negative for altered mental status.   Allergic/Immunologic: Negative.       Objective     /60 (BP Location: Left arm, Patient Position: Sitting)   Pulse 70   Ht 157.5 cm (62\")   Wt 51 kg (112 lb 6.4 oz)   BMI 20.56 kg/m²     Vitals and nursing note reviewed.    Constitutional:       General: Not in acute distress.     Appearance: Well-developed. Not diaphoretic.   Eyes:      Pupils: Pupils are equal, round, and reactive to light.   HENT:      Head: Normocephalic.   Pulmonary:      Effort: Pulmonary effort is normal. No respiratory distress.      Breath sounds: Normal breath sounds.   Cardiovascular:      Normal rate. Regular rhythm.   Pulses:     Decreased pulses.   Edema:     Pretibial: bilateral trace edema of the pretibial area.     Ankle: bilateral trace edema of the ankle.  Abdominal:      General: Bowel sounds are normal.      Palpations: Abdomen is soft.   Musculoskeletal: Normal range of motion.      Cervical back: Normal range of motion. Skin:     General: Skin is warm and dry.   Neurological:      Mental Status: Alert and oriented to person, place, and time.        Procedures          Problem List Items Addressed This Visit        Cardiac and Vasculature    HTN (hypertension)    Relevant Medications    carvedilol (COREG) 6.25 MG tablet    losartan (COZAAR) 100 MG tablet    NIFEdipine XL (PROCARDIA XL) 30 MG 24 hr tablet    furosemide (LASIX) 20 MG tablet    Other Relevant Orders    Ambulatory Referral to Cardiology (Completed)    Carotid stenosis    Hypercholesteremia    Relevant Medications    pravastatin (PRAVACHOL) 20 MG tablet    PVD (peripheral vascular disease) with claudication (HCC) - Primary    Relevant Orders    Ambulatory Referral to Cardiology (Completed)   Other Visit Diagnoses     Pain in both lower extremities        Relevant Orders    Ambulatory Referral to Cardiology (Completed)         1. HTN- well controlled with current medication combination. Continue same.     2. PAD/claudication pain- DP present but weak, feet are warm, reddened. CTA reviewed with multiple areas of stenosis. Medications maximized, CK normal. She would like to seek another opinion regarding the leg pain. Referral made to Dr. Herr.     3. Hyperlipidemia- 6/27/22- LDL 25,  HDL 63, continue pravastatin.     4. Carotid stenosis- no TIA, moderate per last carotid US. Continue antiplatelets, statin. Will repeat US next fu.     BMI is within normal parameters. No other follow-up for BMI required.            Electronically signed by EVELYN Lira,  January 6, 2023 17:05 EST

## 2023-01-05 NOTE — LETTER
January 6, 2023     Navarro Grace MD  [address] [place] [id number]    Patient: Kylah Pena   YOB: 1933   Date of Visit: 1/5/2023       Dear Navarro Grace MD    Kylah Pena was in my office today. Below is a copy of my note.    If you have questions, please do not hesitate to call me. I look forward to following Kylah along with you.         Sincerely,        EVELYN Lira        CC: No Recipients    Chief Complaint   Patient presents with   • Follow-up     Pt is here for cardiac follow up.  Pt states she has been having pain in her chest over the past few days.  She describes it as soreness mid chest.  She also reports SOB.  Pt states she does have dizziness when she first gets up.  She denies palpitations.  Pt does take a daily ASA.   • Med Refill     Pt request 30 day refills to be sent to [facility].  Medications were verified by the pt.     • Lab Work     Pt states their last labs were about a month ago with Dr Lafleur.       Subjective       Kylah Pena is a 89 y.o. female with RA, SVT s/p ablation, PVD, HTN, dyslipidemia and carotid stenosis. She underwent angioplasty and stenting to both legs by Dr. Phillips in 2014. In 2016, HCTZ stopped due to renal function. Coreg increased. Blood pressure was not at goal and Procardia added. In February 2019, Pletal added with positive response. Carotid US 2/2020 for dizziness and showed 50% MADI and 50-69% LICA. One year fu on 8/12/21 showed no change. Echo also remained stable.      She underwent biopsy temporal artery outpatient per Dr. Frausto on 11/23//21. After the procedure, she c/o chest pain radiating to left shoulder found to have bp 190/90. She was admitted to [facility]. EKG and troponin neg. Pain resolved with nitro patch. Lexiscan negative for ischemia. Carotid US reported as no flow on the right and <50% on the left. Suboptimal study suggested.    She returns today for follow up. She was referred to Dr. Flores to  evaluate PAD, bilateral leg pain, abnormal CTA showing multiple areas of stenosis. According to her, was advised nothing could be done. Per Dr. Flores note, he wanted to review previous surgical reports before making recommendations. She was in [facility] 2 months ago with N/V/D. CC is persistent, severe bilateral leg pain.       Cardiac History:    Past Surgical History:   Procedure Laterality Date   • ANGIOPLASTY  03/11/2014    (R) L/E Angioplasty with stent placement (Dr. Phillips)   • ANGIOPLASTY  02/21/2014    (L) Lower Extremity Angioplasty with stent placement (Dr. Phillips)   • CARDIOVASCULAR STRESS TEST  03/31/2003    D. Myoview-(Atropine) 90% THR. Negative   • CARDIOVASCULAR STRESS TEST  06/10/2009    Stress 2 min 22 sec. 68% THR. Negative   • CARDIOVASCULAR STRESS TEST  08/30/2010    P Myoview- Negative   • CARDIOVASCULAR STRESS TEST  09/24/2012    Stress- (Mod) 3 min 32 sec. 89% THR. 182/62. Negative   • CARDIOVASCULAR STRESS TEST  12/17/2014    Lexiscan- ([facility])- EF 65%, negative for ischemia   • CARDIOVASCULAR STRESS TEST  11/24/2021    ([facility]) Lexiscan- EF >70%, no ishcemia   • CONVERTED (HISTORICAL) HOLTER  12/10/2007    Holter- 58 BPM. One 3 beat SVT ( Dr. Isbell) Multiple artifact   • CONVERTED (HISTORICAL) HOLTER  07/09/2013    Holter- AVG HR 68 BPM   • CT ANGIO LWR EXTREMITY UNILATERAL W WO CONTRAST  07/26/2013    Severe Bilateral Distal Disease   • ECHO - CONVERTED  04/17/2008    Echo- EF 60%, mod MR, small rim of a pericardial effusion   • ECHO - CONVERTED  06/10/2009    Echo- EF >60%. moderate AR. RVSP- 42 mmHg   • ECHO - CONVERTED  09/24/2012    Echo- EF 65-70%, RVSP 47 mmHg   • ECHO - CONVERTED  12/17/2014    Echo- ([facility]) EF 60%, mild MR, mild pericardial effusion   • ECHO - CONVERTED  02/01/2017    EF > 65%,mod MR, RVSP 35-40 mmHg   • ECHO - CONVERTED  02/24/2020    EF 70%. Mild MR. RVSP- 44 mmHg. Small Pericardial Effusion   • ECHO - CONVERTED  09/21/2021    EF > 70%. Mild MR. RVSP- m49 mmHg. Small PE    • ECHO - CONVERTED  11/24/2021    ([facility]) EF 65%, LVH, sm pericardial eff, RVSP 51 mmHg   • EP STUDY  07/28/2010    EP study with ablation of SVT. (Dr. Cochran)   • OTHER SURGICAL HISTORY  07/12/2014    WOO- 0.8 & 0.9   • US CAROTID UNILATERAL  07/21/2015    Carotid- Possible 50-69% stenosis on the right. CTA recommended, creatinine of 1.4   • US CAROTID UNILATERAL  02/24/2020    Moderate stenosis bilaterally    • US CAROTID UNILATERAL  08/12/2021    Levi:  Moderate bilateral stenosis   • US CAROTID UNILATERAL  11/24/2021    No flow to MADI, <50% LICA (suboptimal study)     Current Outpatient Medications   Medication Sig Dispense Refill   • ALPRAZolam (XANAX) 0.5 MG tablet Take 0.5 mg by mouth 2 (two) times a day.     • aspirin 81 MG EC tablet Take 81 mg by mouth daily.     • carvedilol (COREG) 6.25 MG tablet Take 1 tablet by mouth 2 (Two) Times a Day. 60 tablet 8   • cilostazol (PLETAL) 50 MG tablet Take 1 tablet by mouth 2 (Two) Times a Day. 60 tablet 8   • clopidogrel (PLAVIX) 75 MG tablet Take 1 tablet by mouth Daily. 30 tablet 8   • furosemide (LASIX) 20 MG tablet Take 1 tablet by mouth Daily. 30 tablet 8   • gabapentin (NEURONTIN) 300 MG capsule Take 300 mg by mouth 3 (Three) Times a Day.     • latanoprost (XALATAN) 0.005 % ophthalmic solution 1 drop Every Night.     • losartan (COZAAR) 100 MG tablet Take 0.5 tablets by mouth Daily. 15 tablet 8   • methotrexate 2.5 MG tablet Take 2.5 mg by mouth 1 (One) Time Per Week. Take 4 tablets daily on Thursday     • NIFEdipine XL (PROCARDIA XL) 30 MG 24 hr tablet Take 1 tablet by mouth Daily. 30 tablet 8   • nitroglycerin (NITROSTAT) 0.4 MG SL tablet Place 1 tablet under the tongue Every 5 (Five) Minutes As Needed for Chest Pain (x3). Take no more than 3 doses in 15 minutes. 30 tablet 1   • nortriptyline (PAMELOR) 10 MG capsule Take 10 mg by mouth every night.     • pantoprazole (PROTONIX) 40 MG EC tablet TAKE ONE TABLET BY MOUTH DAILY 90 tablet 2   • pravastatin  (PRAVACHOL) 20 MG tablet Take 1 tablet by mouth Daily. 30 tablet 8   • traMADol (ULTRAM) 50 MG tablet Take 50 mg by mouth 2 (Two) Times a Day.       No current facility-administered medications for this visit.     Patient has no known allergies.    Past Medical History:   Diagnosis Date   • Abnormal PFT 2009    Mild restrictive ventilatory defect and diffusion capacity mildly enlarged   • Chest x-ray abnormality 2009    Small (R) pleural effusion   • Fibromyalgia    • GERD (gastroesophageal reflux disease)    • H/O eye surgery 2008    Cataracts removed L eye.    • H/O: hysterectomy    • History of cholecystectomy    • Hypertension    • Pericardial effusion     mild   • RA (rheumatoid arthritis) (HCC)    • Vasculitis (HCC)    • Vasculitis (HCC)      Social History     Socioeconomic History   • Marital status:    • Number of children: 3   Tobacco Use   • Smoking status: Former     Years: 5.00     Types: Cigarettes     Quit date:      Years since quittin.0   • Smokeless tobacco: Never   • Tobacco comments:     pt  states that she only smoked a few cigarettes a day off an on for a few years   Vaping Use   • Vaping Use: Never used   Substance and Sexual Activity   • Alcohol use: No   • Drug use: No   • Sexual activity: Defer     Family History   Problem Relation Age of Onset   • Heart attack Sister      Review of Systems   Constitutional: Positive for weight loss (-6). Negative for decreased appetite and malaise/fatigue.   HENT: Negative.    Eyes: Negative for blurred vision.   Cardiovascular: Positive for claudication. Negative for chest pain, dyspnea on exertion, leg swelling, palpitations and syncope.   Respiratory: Negative for shortness of breath and sleep disturbances due to breathing.    Endocrine: Negative.    Hematologic/Lymphatic: Negative for bleeding problem. Does not bruise/bleed easily.   Skin: Negative.    Musculoskeletal: Negative for falls and myalgias.    Gastrointestinal: Negative for abdominal pain, heartburn and melena.   Genitourinary: Negative for hematuria.   Neurological: Negative for dizziness and light-headedness.   Psychiatric/Behavioral: Negative for altered mental status.   Allergic/Immunologic: Negative.       Objective      /60 (BP Location: Left arm, Patient Position: Sitting)   Pulse 70   Ht 157.5 cm (62\")   Wt 51 kg (112 lb 6.4 oz)   BMI 20.56 kg/m²     Vitals and nursing note reviewed.   Constitutional:       General: Not in acute distress.     Appearance: Well-developed. Not diaphoretic.   Eyes:      Pupils: Pupils are equal, round, and reactive to light.   HENT:      Head: Normocephalic.   Pulmonary:      Effort: Pulmonary effort is normal. No respiratory distress.      Breath sounds: Normal breath sounds.   Cardiovascular:      Normal rate. Regular rhythm.   Pulses:     Decreased pulses.   Edema:     Pretibial: bilateral trace edema of the pretibial area.     Ankle: bilateral trace edema of the ankle.  Abdominal:      General: Bowel sounds are normal.      Palpations: Abdomen is soft.   Musculoskeletal: Normal range of motion.      Cervical back: Normal range of motion. Skin:     General: Skin is warm and dry.   Neurological:      Mental Status: Alert and oriented to person, place, and time.        Procedures         Problem List Items Addressed This Visit        Cardiac and Vasculature    HTN (hypertension)    Relevant Medications    carvedilol (COREG) 6.25 MG tablet    losartan (COZAAR) 100 MG tablet    NIFEdipine XL (PROCARDIA XL) 30 MG 24 hr tablet    furosemide (LASIX) 20 MG tablet    Other Relevant Orders    Ambulatory Referral to Cardiology (Completed)    Carotid stenosis    Hypercholesteremia    Relevant Medications    pravastatin (PRAVACHOL) 20 MG tablet    PVD (peripheral vascular disease) with claudication (HCC) - Primary    Relevant Orders    Ambulatory Referral to Cardiology (Completed)   Other Visit Diagnoses     Pain in  both lower extremities        Relevant Orders    Ambulatory Referral to Cardiology (Completed)         1. HTN- well controlled with current medication combination. Continue same.     2. PAD/claudication pain- DP present but weak, feet are warm, reddened. CTA reviewed with multiple areas of stenosis. Medications maximized, CK normal. She would like to seek another opinion regarding the leg pain. Referral made to Dr. Herr.     3. Hyperlipidemia- 6/27/22- LDL 25, HDL 63, continue pravastatin.     4. Carotid stenosis- no TIA, moderate per last carotid US. Continue antiplatelets, statin. Will repeat US next fu.     BMI is within normal parameters. No other follow-up for BMI required.           Electronically signed by EVELYN Lira,  January 6, 2023 17:00 EST

## 2023-01-06 PROBLEM — E78.5 HYPERLIPEMIA: Status: RESOLVED | Noted: 2017-01-30 | Resolved: 2023-01-06

## 2023-07-24 ENCOUNTER — OFFICE VISIT (OUTPATIENT)
Dept: CARDIOLOGY | Facility: CLINIC | Age: 88
End: 2023-07-24
Payer: MEDICARE

## 2023-07-24 VITALS
DIASTOLIC BLOOD PRESSURE: 70 MMHG | SYSTOLIC BLOOD PRESSURE: 120 MMHG | HEART RATE: 60 BPM | WEIGHT: 110.2 LBS | HEIGHT: 62 IN | BODY MASS INDEX: 20.28 KG/M2

## 2023-07-24 DIAGNOSIS — E78.00 HYPERCHOLESTEREMIA: ICD-10-CM

## 2023-07-24 DIAGNOSIS — I65.21 STENOSIS OF RIGHT CAROTID ARTERY: ICD-10-CM

## 2023-07-24 DIAGNOSIS — I10 PRIMARY HYPERTENSION: Primary | ICD-10-CM

## 2023-07-24 DIAGNOSIS — I73.9 PVD (PERIPHERAL VASCULAR DISEASE) WITH CLAUDICATION: ICD-10-CM

## 2023-07-24 PROCEDURE — 99214 OFFICE O/P EST MOD 30 MIN: CPT | Performed by: NURSE PRACTITIONER

## 2023-07-24 RX ORDER — FUROSEMIDE 20 MG/1
20 TABLET ORAL DAILY
Qty: 90 TABLET | Refills: 3 | Status: SHIPPED | OUTPATIENT
Start: 2023-07-24

## 2023-07-24 RX ORDER — LOSARTAN POTASSIUM 100 MG/1
100 TABLET ORAL DAILY
Qty: 90 TABLET | Refills: 3 | Status: SHIPPED | OUTPATIENT
Start: 2023-07-24

## 2023-07-24 RX ORDER — VITAMIN B COMPLEX
100 TABLET ORAL DAILY
Qty: 90 EACH | Refills: 3 | Status: SHIPPED | OUTPATIENT
Start: 2023-07-24

## 2023-07-24 RX ORDER — CILOSTAZOL 50 MG/1
50 TABLET ORAL 2 TIMES DAILY
Qty: 180 TABLET | Refills: 3 | Status: SHIPPED | OUTPATIENT
Start: 2023-07-24

## 2023-07-24 RX ORDER — CARVEDILOL 6.25 MG/1
6.25 TABLET ORAL 2 TIMES DAILY
Qty: 180 TABLET | Refills: 3 | Status: SHIPPED | OUTPATIENT
Start: 2023-07-24

## 2023-07-24 RX ORDER — NIFEDIPINE 30 MG/1
30 TABLET, EXTENDED RELEASE ORAL DAILY
Qty: 90 TABLET | Refills: 3 | Status: SHIPPED | OUTPATIENT
Start: 2023-07-24

## 2023-07-24 RX ORDER — PRAVASTATIN SODIUM 20 MG
20 TABLET ORAL DAILY
Qty: 90 TABLET | Refills: 3 | Status: SHIPPED | OUTPATIENT
Start: 2023-07-24

## 2023-07-24 RX ORDER — CLOPIDOGREL BISULFATE 75 MG/1
75 TABLET ORAL DAILY
Qty: 90 TABLET | Refills: 3 | Status: SHIPPED | OUTPATIENT
Start: 2023-07-24

## 2023-07-24 NOTE — PROGRESS NOTES
Chief Complaint   Patient presents with    Follow-up     Cardiac management    Lab     Last labs per Dr Lafleur a week ago. Last labs per Dr Grace 3 months ago.    Leg cramps     Notices more at night. She did have surgery per Dr Herr in March, attempting to obtain records.    Dizziness     Has random episodes of dizziness. Will also notice when standing up. Having headaches in back of head.    Med Refill     Needs refills on cardiac medications-90 day.     Thierry Pena is a 89 y.o. female with RA, SVT s/p ablation, PVD, HTN, dyslipidemia and carotid stenosis. She underwent angioplasty and stenting to both legs by Dr. Phillips in 2014. In 2016, HCTZ stopped due to renal function. Coreg increased. Blood pressure was not at goal and Procardia added. In February 2019, Pletal added with positive response. Carotid US 2/2020 for dizziness and showed 50% MADI and 50-69% LICA. One year fu on 8/12/21 showed no change. Echo also remained stable.      She underwent biopsy temporal artery outpatient per Dr. Frausto on 11/23//21. After the procedure, she c/o chest pain radiating to left shoulder found to have bp 190/90. She was admitted to [facility]. EKG and troponin neg. Pain resolved with nitro patch. Lexiscan negative for ischemia. Carotid US reported as no flow on the right and <50% on the left. Suboptimal study suggested. She followed with Dr. Flores for PAD who recommended conservative management.      She returns today for follow up. For persistent bilateral leg pain, she was referred to Dr. Herr, underwent intervention with significant improvement. She is feeling very well. She turned 89 in March and is able to do many more activities. Walking better. No further abdominal pain. Labs followed by Dr. Grace and Dr. Lafleur.        Cardiac History:    Past Surgical History:   Procedure Laterality Date    ANGIOPLASTY  03/11/2014    (R) L/E Angioplasty with stent placement (Dr. Phillips)    ANGIOPLASTY  02/21/2014     (L) Lower Extremity Angioplasty with stent placement (Dr. Phillips)    CARDIOVASCULAR STRESS TEST  03/31/2003    D. Myoview-(Atropine) 90% THR. Negative    CARDIOVASCULAR STRESS TEST  06/10/2009    Stress 2 min 22 sec. 68% THR. Negative    CARDIOVASCULAR STRESS TEST  08/30/2010    P Myoview- Negative    CARDIOVASCULAR STRESS TEST  09/24/2012    Stress- (Mod) 3 min 32 sec. 89% THR. 182/62. Negative    CARDIOVASCULAR STRESS TEST  12/17/2014    Lexiscan- ([facility])- EF 65%, negative for ischemia    CARDIOVASCULAR STRESS TEST  11/24/2021    ([facility]) Lexiscan- EF >70%, no ishcemia    CONVERTED (HISTORICAL) HOLTER  12/10/2007    Holter- 58 BPM. One 3 beat SVT ( Dr. Isbell) Multiple artifact    CONVERTED (HISTORICAL) HOLTER  07/09/2013    Holter- AVG HR 68 BPM    CT ANGIO LWR EXTREMITY UNILATERAL W WO CONTRAST  07/26/2013    Severe Bilateral Distal Disease    ECHO - CONVERTED  04/17/2008    Echo- EF 60%, mod MR, small rim of a pericardial effusion    ECHO - CONVERTED  06/10/2009    Echo- EF >60%. moderate AR. RVSP- 42 mmHg    ECHO - CONVERTED  09/24/2012    Echo- EF 65-70%, RVSP 47 mmHg    ECHO - CONVERTED  12/17/2014    Echo- ([facility]) EF 60%, mild MR, mild pericardial effusion    ECHO - CONVERTED  02/01/2017    EF > 65%,mod MR, RVSP 35-40 mmHg    ECHO - CONVERTED  02/24/2020    EF 70%. Mild MR. RVSP- 44 mmHg. Small Pericardial Effusion    ECHO - CONVERTED  09/21/2021    EF > 70%. Mild MR. RVSP- m49 mmHg. Small PE    ECHO - CONVERTED  11/24/2021    ([facility]) EF 65%, LVH, sm pericardial eff, RVSP 51 mmHg    EP STUDY  07/28/2010    EP study with ablation of SVT. (Dr. Cochran)    OTHER SURGICAL HISTORY  07/12/2014    WOO- 0.8 & 0.9    US CAROTID UNILATERAL  07/21/2015    Carotid- Possible 50-69% stenosis on the right. CTA recommended, creatinine of 1.4    US CAROTID UNILATERAL  02/24/2020    Moderate stenosis bilaterally     US CAROTID UNILATERAL  08/12/2021    Levi:  Moderate bilateral stenosis    US CAROTID UNILATERAL  11/24/2021    No  flow to MADI, <50% LICA (suboptimal study)     Current Outpatient Medications   Medication Sig Dispense Refill    ALPRAZolam (XANAX) 0.5 MG tablet Take 1 tablet by mouth 2 (Two) Times a Day.      aspirin 81 MG EC tablet Take 1 tablet by mouth Daily.      carvedilol (COREG) 6.25 MG tablet Take 1 tablet by mouth 2 (Two) Times a Day. 180 tablet 3    cilostazol (PLETAL) 50 MG tablet Take 1 tablet by mouth 2 (Two) Times a Day. 180 tablet 3    clopidogrel (PLAVIX) 75 MG tablet Take 1 tablet by mouth Daily. 90 tablet 3    furosemide (LASIX) 20 MG tablet Take 1 tablet by mouth Daily. 90 tablet 3    gabapentin (NEURONTIN) 300 MG capsule Take 1 capsule by mouth 3 (Three) Times a Day.      latanoprost (XALATAN) 0.005 % ophthalmic solution 1 drop Every Night.      losartan (COZAAR) 100 MG tablet Take 1 tablet by mouth Daily. 90 tablet 3    methotrexate 2.5 MG tablet Take 1 tablet by mouth 1 (One) Time Per Week. Take 4 tablets daily on Thursday      NIFEdipine XL (PROCARDIA XL) 30 MG 24 hr tablet Take 1 tablet by mouth Daily. 90 tablet 3    nitroglycerin (NITROSTAT) 0.4 MG SL tablet Place 1 tablet under the tongue Every 5 (Five) Minutes As Needed for Chest Pain (x3). Take no more than 3 doses in 15 minutes. 30 tablet 1    nortriptyline (PAMELOR) 10 MG capsule Take 1 capsule by mouth Every Night.      pantoprazole (PROTONIX) 40 MG EC tablet TAKE ONE TABLET BY MOUTH DAILY 90 tablet 2    pravastatin (PRAVACHOL) 20 MG tablet Take 1 tablet by mouth Daily. 90 tablet 3    traMADol (ULTRAM) 50 MG tablet Take 1 tablet by mouth 2 (Two) Times a Day.      Coenzyme Q10 (CoQ10) 100 MG capsule Take 1 capsule by mouth Daily. 90 each 3     No current facility-administered medications for this visit.     Patient has no known allergies.    Past Medical History:   Diagnosis Date    Abnormal PFT 06/23/2009    Mild restrictive ventilatory defect and diffusion capacity mildly enlarged    Chest x-ray abnormality 06/11/2009    Small (R) pleural effusion  "   Fibromyalgia     GERD (gastroesophageal reflux disease)     H/O eye surgery 2008    Cataracts removed L eye.     H/O: hysterectomy     History of cholecystectomy     Hypertension     Pericardial effusion     mild    RA (rheumatoid arthritis)     Vasculitis     Vasculitis      Social History     Socioeconomic History    Marital status:     Number of children: 3   Tobacco Use    Smoking status: Former     Years: 5.00     Types: Cigarettes     Quit date:      Years since quittin.5    Smokeless tobacco: Never    Tobacco comments:     pt  states that she only smoked a few cigarettes a day off an on for a few years   Vaping Use    Vaping Use: Never used   Substance and Sexual Activity    Alcohol use: No    Drug use: No    Sexual activity: Defer     Family History   Problem Relation Age of Onset    Heart attack Sister      Review of Systems   Constitutional: Negative for decreased appetite and malaise/fatigue.   HENT: Negative.     Eyes:  Negative for blurred vision.   Cardiovascular:  Negative for chest pain, dyspnea on exertion, leg swelling, palpitations and syncope.   Respiratory:  Negative for shortness of breath and sleep disturbances due to breathing.    Endocrine: Negative.    Hematologic/Lymphatic: Negative for bleeding problem. Does not bruise/bleed easily.   Skin: Negative.    Musculoskeletal:  Negative for falls and myalgias.   Gastrointestinal:  Negative for abdominal pain, heartburn and melena.   Genitourinary:  Negative for hematuria.   Neurological:  Negative for dizziness and light-headedness.   Psychiatric/Behavioral:  Negative for altered mental status.    Allergic/Immunologic: Negative.       Objective     /70 (BP Location: Left arm)   Pulse 60   Ht 157.5 cm (62.01\")   Wt 50 kg (110 lb 3.2 oz)   BMI 20.15 kg/m²     Vitals and nursing note reviewed.   Constitutional:       General: Not in acute distress.     Appearance: Well-developed. Not diaphoretic.   Eyes:      " Pupils: Pupils are equal, round, and reactive to light.   HENT:      Head: Normocephalic.   Pulmonary:      Effort: Pulmonary effort is normal. No respiratory distress.      Breath sounds: Normal breath sounds.   Cardiovascular:      Normal rate. Regular rhythm.   Pulses:     Intact distal pulses.   Edema:     Peripheral edema absent.   Abdominal:      General: Bowel sounds are normal.      Palpations: Abdomen is soft.   Musculoskeletal: Normal range of motion.      Cervical back: Normal range of motion. Skin:     General: Skin is warm and dry.   Neurological:      Mental Status: Alert and oriented to person, place, and time.      Procedures          Problem List Items Addressed This Visit          Cardiac and Vasculature    HTN (hypertension) - Primary    Relevant Medications    carvedilol (COREG) 6.25 MG tablet    losartan (COZAAR) 100 MG tablet    NIFEdipine XL (PROCARDIA XL) 30 MG 24 hr tablet    furosemide (LASIX) 20 MG tablet    Carotid stenosis    Hypercholesteremia    Relevant Medications    pravastatin (PRAVACHOL) 20 MG tablet    PVD (peripheral vascular disease) with claudication      1. HTN- well controlled with current medication combination. Continue same.      2. PAD/claudication pain- s/p intervention with Dr. Herr. Improved leg pain and abd pain. Continue regular walking.  On Pletal, Plavix and aspirin without bleeding.      3. Hyperlipidemia- 6/27/22- LDL 25, HDL 63, continue pravastatin. Labs followed by Dr. Grace.      4. Carotid stenosis- no TIA, moderate per last carotid US. Continue antiplatelets, statin. Will repeat US next fu if not done with Dr. Herr.     BMI is within normal parameters. No other follow-up for BMI required.               Electronically signed by EVELYN Lira,  July 27, 2023 12:02 EDT

## 2023-07-24 NOTE — LETTER
July 27, 2023       No Recipients    Patient: Kylah Pena   YOB: 1933   Date of Visit: 7/24/2023       Dear Navarro Grace MD    Kylah Pena was in my office today. Below is a copy of my note.    If you have questions, please do not hesitate to call me. I look forward to following Kylah along with you.         Sincerely,        Charissa Lee, EVELYN        CC:   No Recipients    Chief Complaint   Patient presents with   • Follow-up     Cardiac management   • Lab     Last labs per Dr Lafleur a week ago. Last labs per Dr Grace 3 months ago.   • Leg cramps     Notices more at night. She did have surgery per Dr Herr in March, attempting to obtain records.   • Dizziness     Has random episodes of dizziness. Will also notice when standing up. Having headaches in back of head.   • Med Refill     Needs refills on cardiac medications-90 day.     Subjective      Kylah Pena is a 89 y.o. female with RA, SVT s/p ablation, PVD, HTN, dyslipidemia and carotid stenosis. She underwent angioplasty and stenting to both legs by Dr. Phillips in 2014. In 2016, HCTZ stopped due to renal function. Coreg increased. Blood pressure was not at goal and Procardia added. In February 2019, Pletal added with positive response. Carotid US 2/2020 for dizziness and showed 50% MADI and 50-69% LICA. One year fu on 8/12/21 showed no change. Echo also remained stable.      She underwent biopsy temporal artery outpatient per Dr. Frausto on 11/23//21. After the procedure, she c/o chest pain radiating to left shoulder found to have bp 190/90. She was admitted to [facility]. EKG and troponin neg. Pain resolved with nitro patch. Lexiscan negative for ischemia. Carotid US reported as no flow on the right and <50% on the left. Suboptimal study suggested. She followed with Dr. Flores for PAD who recommended conservative management.      She returns today for follow up. For persistent bilateral leg pain, she was referred to Dr. Herr, underwent  intervention with significant improvement. She is feeling very well. She turned 89 in March and is able to do many more activities. Walking better. No further abdominal pain. Labs followed by Dr. Grace and Dr. Lafleur.        Cardiac History:    Past Surgical History:   Procedure Laterality Date   • ANGIOPLASTY  03/11/2014    (R) L/E Angioplasty with stent placement (Dr. Phillips)   • ANGIOPLASTY  02/21/2014    (L) Lower Extremity Angioplasty with stent placement (Dr. Phillips)   • CARDIOVASCULAR STRESS TEST  03/31/2003    D. Myoview-(Atropine) 90% THR. Negative   • CARDIOVASCULAR STRESS TEST  06/10/2009    Stress 2 min 22 sec. 68% THR. Negative   • CARDIOVASCULAR STRESS TEST  08/30/2010    P Myoview- Negative   • CARDIOVASCULAR STRESS TEST  09/24/2012    Stress- (Mod) 3 min 32 sec. 89% THR. 182/62. Negative   • CARDIOVASCULAR STRESS TEST  12/17/2014    Lexiscan- ([facility])- EF 65%, negative for ischemia   • CARDIOVASCULAR STRESS TEST  11/24/2021    ([facility]) Lexiscan- EF >70%, no ishcemia   • CONVERTED (HISTORICAL) HOLTER  12/10/2007    Holter- 58 BPM. One 3 beat SVT ( Dr. Isbell) Multiple artifact   • CONVERTED (HISTORICAL) HOLTER  07/09/2013    Holter- AVG HR 68 BPM   • CT ANGIO LWR EXTREMITY UNILATERAL W WO CONTRAST  07/26/2013    Severe Bilateral Distal Disease   • ECHO - CONVERTED  04/17/2008    Echo- EF 60%, mod MR, small rim of a pericardial effusion   • ECHO - CONVERTED  06/10/2009    Echo- EF >60%. moderate AR. RVSP- 42 mmHg   • ECHO - CONVERTED  09/24/2012    Echo- EF 65-70%, RVSP 47 mmHg   • ECHO - CONVERTED  12/17/2014    Echo- ([facility]) EF 60%, mild MR, mild pericardial effusion   • ECHO - CONVERTED  02/01/2017    EF > 65%,mod MR, RVSP 35-40 mmHg   • ECHO - CONVERTED  02/24/2020    EF 70%. Mild MR. RVSP- 44 mmHg. Small Pericardial Effusion   • ECHO - CONVERTED  09/21/2021    EF > 70%. Mild MR. RVSP- m49 mmHg. Small PE   • ECHO - CONVERTED  11/24/2021    ([facility]) EF 65%, LVH, sm pericardial eff, RVSP 51 mmHg   • EP  STUDY  07/28/2010    EP study with ablation of SVT. (Dr. Cochran)   • OTHER SURGICAL HISTORY  07/12/2014    WOO- 0.8 & 0.9   • US CAROTID UNILATERAL  07/21/2015    Carotid- Possible 50-69% stenosis on the right. CTA recommended, creatinine of 1.4   • US CAROTID UNILATERAL  02/24/2020    Moderate stenosis bilaterally    • US CAROTID UNILATERAL  08/12/2021    Levi:  Moderate bilateral stenosis   • US CAROTID UNILATERAL  11/24/2021    No flow to MADI, <50% LICA (suboptimal study)     Current Outpatient Medications   Medication Sig Dispense Refill   • ALPRAZolam (XANAX) 0.5 MG tablet Take 1 tablet by mouth 2 (Two) Times a Day.     • aspirin 81 MG EC tablet Take 1 tablet by mouth Daily.     • carvedilol (COREG) 6.25 MG tablet Take 1 tablet by mouth 2 (Two) Times a Day. 180 tablet 3   • cilostazol (PLETAL) 50 MG tablet Take 1 tablet by mouth 2 (Two) Times a Day. 180 tablet 3   • clopidogrel (PLAVIX) 75 MG tablet Take 1 tablet by mouth Daily. 90 tablet 3   • furosemide (LASIX) 20 MG tablet Take 1 tablet by mouth Daily. 90 tablet 3   • gabapentin (NEURONTIN) 300 MG capsule Take 1 capsule by mouth 3 (Three) Times a Day.     • latanoprost (XALATAN) 0.005 % ophthalmic solution 1 drop Every Night.     • losartan (COZAAR) 100 MG tablet Take 1 tablet by mouth Daily. 90 tablet 3   • methotrexate 2.5 MG tablet Take 1 tablet by mouth 1 (One) Time Per Week. Take 4 tablets daily on Thursday     • NIFEdipine XL (PROCARDIA XL) 30 MG 24 hr tablet Take 1 tablet by mouth Daily. 90 tablet 3   • nitroglycerin (NITROSTAT) 0.4 MG SL tablet Place 1 tablet under the tongue Every 5 (Five) Minutes As Needed for Chest Pain (x3). Take no more than 3 doses in 15 minutes. 30 tablet 1   • nortriptyline (PAMELOR) 10 MG capsule Take 1 capsule by mouth Every Night.     • pantoprazole (PROTONIX) 40 MG EC tablet TAKE ONE TABLET BY MOUTH DAILY 90 tablet 2   • pravastatin (PRAVACHOL) 20 MG tablet Take 1 tablet by mouth Daily. 90 tablet 3   • traMADol (ULTRAM) 50  MG tablet Take 1 tablet by mouth 2 (Two) Times a Day.     • Coenzyme Q10 (CoQ10) 100 MG capsule Take 1 capsule by mouth Daily. 90 each 3     No current facility-administered medications for this visit.     Patient has no known allergies.    Past Medical History:   Diagnosis Date   • Abnormal PFT 2009    Mild restrictive ventilatory defect and diffusion capacity mildly enlarged   • Chest x-ray abnormality 2009    Small (R) pleural effusion   • Fibromyalgia    • GERD (gastroesophageal reflux disease)    • H/O eye surgery 2008    Cataracts removed L eye.    • H/O: hysterectomy    • History of cholecystectomy    • Hypertension    • Pericardial effusion     mild   • RA (rheumatoid arthritis)    • Vasculitis    • Vasculitis      Social History     Socioeconomic History   • Marital status:    • Number of children: 3   Tobacco Use   • Smoking status: Former     Years: 5.00     Types: Cigarettes     Quit date:      Years since quittin.5   • Smokeless tobacco: Never   • Tobacco comments:     pt  states that she only smoked a few cigarettes a day off an on for a few years   Vaping Use   • Vaping Use: Never used   Substance and Sexual Activity   • Alcohol use: No   • Drug use: No   • Sexual activity: Defer     Family History   Problem Relation Age of Onset   • Heart attack Sister      Review of Systems   Constitutional: Negative for decreased appetite and malaise/fatigue.   HENT: Negative.     Eyes:  Negative for blurred vision.   Cardiovascular:  Negative for chest pain, dyspnea on exertion, leg swelling, palpitations and syncope.   Respiratory:  Negative for shortness of breath and sleep disturbances due to breathing.    Endocrine: Negative.    Hematologic/Lymphatic: Negative for bleeding problem. Does not bruise/bleed easily.   Skin: Negative.    Musculoskeletal:  Negative for falls and myalgias.   Gastrointestinal:  Negative for abdominal pain, heartburn and melena.   Genitourinary:   "Negative for hematuria.   Neurological:  Negative for dizziness and light-headedness.   Psychiatric/Behavioral:  Negative for altered mental status.    Allergic/Immunologic: Negative.       Objective    /70 (BP Location: Left arm)   Pulse 60   Ht 157.5 cm (62.01\")   Wt 50 kg (110 lb 3.2 oz)   BMI 20.15 kg/m²     Vitals and nursing note reviewed.   Constitutional:       General: Not in acute distress.     Appearance: Well-developed. Not diaphoretic.   Eyes:      Pupils: Pupils are equal, round, and reactive to light.   HENT:      Head: Normocephalic.   Pulmonary:      Effort: Pulmonary effort is normal. No respiratory distress.      Breath sounds: Normal breath sounds.   Cardiovascular:      Normal rate. Regular rhythm.   Pulses:     Intact distal pulses.   Edema:     Peripheral edema absent.   Abdominal:      General: Bowel sounds are normal.      Palpations: Abdomen is soft.   Musculoskeletal: Normal range of motion.      Cervical back: Normal range of motion. Skin:     General: Skin is warm and dry.   Neurological:      Mental Status: Alert and oriented to person, place, and time.      Procedures          Problem List Items Addressed This Visit          Cardiac and Vasculature    HTN (hypertension) - Primary    Relevant Medications    carvedilol (COREG) 6.25 MG tablet    losartan (COZAAR) 100 MG tablet    NIFEdipine XL (PROCARDIA XL) 30 MG 24 hr tablet    furosemide (LASIX) 20 MG tablet    Carotid stenosis    Hypercholesteremia    Relevant Medications    pravastatin (PRAVACHOL) 20 MG tablet    PVD (peripheral vascular disease) with claudication      1. HTN- well controlled with current medication combination. Continue same.      2. PAD/claudication pain- s/p intervention with Dr. Herr. Improved leg pain and abd pain. Continue regular walking.  On Pletal, Plavix and aspirin without bleeding.      3. Hyperlipidemia- 6/27/22- LDL 25, HDL 63, continue pravastatin. Labs followed by Dr. Grace.      4. " Carotid stenosis- no TIA, moderate per last carotid US. Continue antiplatelets, statin. Will repeat US next fu if not done with Dr. Herr.     BMI is within normal parameters. No other follow-up for BMI required.               Electronically signed by EVELYN Lira,  July 27, 2023 12:02 EDT

## 2024-02-01 ENCOUNTER — OFFICE VISIT (OUTPATIENT)
Dept: CARDIOLOGY | Facility: CLINIC | Age: 89
End: 2024-02-01
Payer: MEDICARE

## 2024-02-01 VITALS
DIASTOLIC BLOOD PRESSURE: 64 MMHG | BODY MASS INDEX: 20.98 KG/M2 | HEART RATE: 64 BPM | HEIGHT: 62 IN | SYSTOLIC BLOOD PRESSURE: 148 MMHG | WEIGHT: 114 LBS

## 2024-02-01 DIAGNOSIS — E78.00 HYPERCHOLESTEREMIA: ICD-10-CM

## 2024-02-01 DIAGNOSIS — I10 PRIMARY HYPERTENSION: ICD-10-CM

## 2024-02-01 DIAGNOSIS — I65.21 STENOSIS OF RIGHT CAROTID ARTERY: ICD-10-CM

## 2024-02-01 DIAGNOSIS — R42 DIZZINESS: Primary | ICD-10-CM

## 2024-02-01 DIAGNOSIS — I73.9 PVD (PERIPHERAL VASCULAR DISEASE) WITH CLAUDICATION: ICD-10-CM

## 2024-02-01 RX ORDER — CARVEDILOL 6.25 MG/1
6.25 TABLET ORAL 2 TIMES DAILY
Qty: 180 TABLET | Refills: 3 | Status: SHIPPED | OUTPATIENT
Start: 2024-02-01

## 2024-02-01 RX ORDER — SIMETHICONE 80 MG
80 TABLET,CHEWABLE ORAL EVERY 6 HOURS PRN
Qty: 90 TABLET | Refills: 3 | Status: SHIPPED | OUTPATIENT
Start: 2024-02-01

## 2024-02-01 RX ORDER — NIFEDIPINE 30 MG/1
30 TABLET, EXTENDED RELEASE ORAL DAILY
Qty: 90 TABLET | Refills: 3 | Status: SHIPPED | OUTPATIENT
Start: 2024-02-01

## 2024-02-01 RX ORDER — CILOSTAZOL 50 MG/1
50 TABLET ORAL 2 TIMES DAILY
Qty: 180 TABLET | Refills: 3 | Status: SHIPPED | OUTPATIENT
Start: 2024-02-01

## 2024-02-01 RX ORDER — CLOPIDOGREL BISULFATE 75 MG/1
75 TABLET ORAL DAILY
Qty: 90 TABLET | Refills: 3 | Status: SHIPPED | OUTPATIENT
Start: 2024-02-01

## 2024-02-01 RX ORDER — LOSARTAN POTASSIUM 100 MG/1
100 TABLET ORAL DAILY
Qty: 90 TABLET | Refills: 3 | Status: SHIPPED | OUTPATIENT
Start: 2024-02-01

## 2024-02-01 RX ORDER — FUROSEMIDE 20 MG/1
20 TABLET ORAL DAILY
Qty: 90 TABLET | Refills: 3 | Status: SHIPPED | OUTPATIENT
Start: 2024-02-01

## 2024-02-01 RX ORDER — PRAVASTATIN SODIUM 20 MG
20 TABLET ORAL DAILY
Qty: 90 TABLET | Refills: 3 | Status: SHIPPED | OUTPATIENT
Start: 2024-02-01

## 2024-02-01 NOTE — PROGRESS NOTES
"Chief Complaint   Patient presents with    Follow-up     Cardiac management    Lab     Last labs in July per Dr Lafleur.     Headache     Reports having headache in back of head, states \"its a different kind of headache, hurts more at night\".    Joint stiffness     Having pain in legs and joints. She follows with Dr Herr.     Chest Pain     Reports at night she will have pain in mid chest, she thought it was indigestion. States \"I can not burp and then has pressure in neck and face. I feel hot in my face and neck.\"    Med Refill     Will need refills on cardiac medications-90 day.        HPI:  FAVIOLA Pena is a 89 y.o. female with RA, SVT s/p ablation, PVD, HTN, dyslipidemia and carotid stenosis. She underwent angioplasty and stenting to both legs by Dr. Phillips in 2014. In 2016, HCTZ stopped due to renal function. Coreg increased. Blood pressure was not at goal and Procardia added. In February 2019, Pletal added with positive response. Carotid US 2/2020 for dizziness and showed 50% MADI and 50-69% LICA. One year fu on 8/12/21 showed no change. Echo also remained stable.      She underwent biopsy temporal artery outpatient per Dr. Frausto on 11/23//21. After the procedure, she c/o chest pain radiating to left shoulder found to have bp 190/90. She was admitted to [facility]. EKG and troponin neg. Pain resolved with nitro patch. Lexiscan negative for ischemia. Carotid US reported as no flow on the right and <50% on the left. Suboptimal study suggested. She followed with Dr. Flores for PAD who recommended conservative management.      For persistent bilateral leg pain, she was referred to Dr. Herr, underwent intervention with significant improvement. She is feeling very well. She turned 89 in March 2023 and has been able to do many more activities. Walking better. No further abdominal pain. Labs followed by Dr. Grace and Dr. Lafleur.     She returns today for follow up. Patient denies chest pain, palpitations, tightness, " "dizziness, shortness of air. She states she is having chest pressure mostly at night that feels like she has to belch, but can't. She reports if she does burp, it relieves the pressure. She also is reporting a headache at the base of her skull that is decscribed as \"different than any headache before.\" As well as dizziness. BP elevated today, but she states it stays in the SBP 130s at home.       Cardiac History:    Past Surgical History:   Procedure Laterality Date    ANGIOPLASTY  03/11/2014    (R) L/E Angioplasty with stent placement (Dr. Phillips)    ANGIOPLASTY  02/21/2014    (L) Lower Extremity Angioplasty with stent placement (Dr. Phillips)    CARDIOVASCULAR STRESS TEST  03/31/2003    D. Myoview-(Atropine) 90% THR. Negative    CARDIOVASCULAR STRESS TEST  06/10/2009    Stress 2 min 22 sec. 68% THR. Negative    CARDIOVASCULAR STRESS TEST  08/30/2010    P Myoview- Negative    CARDIOVASCULAR STRESS TEST  09/24/2012    Stress- (Mod) 3 min 32 sec. 89% THR. 182/62. Negative    CARDIOVASCULAR STRESS TEST  12/17/2014    Lexiscan- ([facility])- EF 65%, negative for ischemia    CARDIOVASCULAR STRESS TEST  11/24/2021    ([facility]) Lexiscan- EF >70%, no ishcemia    CONVERTED (HISTORICAL) HOLTER  12/10/2007    Holter- 58 BPM. One 3 beat SVT ( Dr. Isbell) Multiple artifact    CONVERTED (HISTORICAL) HOLTER  07/09/2013    Holter- AVG HR 68 BPM    CT ANGIO LWR EXTREMITY UNILATERAL W WO CONTRAST  07/26/2013    Severe Bilateral Distal Disease    ECHO - CONVERTED  04/17/2008    Echo- EF 60%, mod MR, small rim of a pericardial effusion    ECHO - CONVERTED  06/10/2009    Echo- EF >60%. moderate AR. RVSP- 42 mmHg    ECHO - CONVERTED  09/24/2012    Echo- EF 65-70%, RVSP 47 mmHg    ECHO - CONVERTED  12/17/2014    Echo- ([facility]) EF 60%, mild MR, mild pericardial effusion    ECHO - CONVERTED  02/01/2017    EF > 65%,mod MR, RVSP 35-40 mmHg    ECHO - CONVERTED  02/24/2020    EF 70%. Mild MR. RVSP- 44 mmHg. Small Pericardial Effusion    ECHO - CONVERTED  " 09/21/2021    EF > 70%. Mild MR. RVSP- m49 mmHg. Small PE    ECHO - CONVERTED  11/24/2021    ([facility]) EF 65%, LVH, sm pericardial eff, RVSP 51 mmHg    EP STUDY  07/28/2010    EP study with ablation of SVT. (Dr. Cochran)    OTHER SURGICAL HISTORY  07/12/2014    WOO- 0.8 & 0.9    US CAROTID UNILATERAL  07/21/2015    Carotid- Possible 50-69% stenosis on the right. CTA recommended, creatinine of 1.4    US CAROTID UNILATERAL  02/24/2020    Moderate stenosis bilaterally     US CAROTID UNILATERAL  08/12/2021    Levi:  Moderate bilateral stenosis    US CAROTID UNILATERAL  11/24/2021    No flow to MADI, <50% LICA (suboptimal study)       Current Outpatient Medications   Medication Sig Dispense Refill    ALPRAZolam (XANAX) 0.5 MG tablet Take 1 tablet by mouth 2 (Two) Times a Day.      aspirin 81 MG EC tablet Take 1 tablet by mouth Daily.      carvedilol (COREG) 6.25 MG tablet Take 1 tablet by mouth 2 (Two) Times a Day. 180 tablet 3    cilostazol (PLETAL) 50 MG tablet Take 1 tablet by mouth 2 (Two) Times a Day. 180 tablet 3    clopidogrel (PLAVIX) 75 MG tablet Take 1 tablet by mouth Daily. 90 tablet 3    Coenzyme Q10 (CoQ10) 100 MG capsule Take 1 capsule by mouth Daily. 90 each 3    furosemide (LASIX) 20 MG tablet Take 1 tablet by mouth Daily. 90 tablet 3    gabapentin (NEURONTIN) 300 MG capsule Take 1 capsule by mouth 3 (Three) Times a Day.      latanoprost (XALATAN) 0.005 % ophthalmic solution 1 drop Every Night.      losartan (COZAAR) 100 MG tablet Take 1 tablet by mouth Daily. 90 tablet 3    methotrexate 2.5 MG tablet Take 1 tablet by mouth 1 (One) Time Per Week. Take 4 tablets daily on Thursday      NIFEdipine XL (PROCARDIA XL) 30 MG 24 hr tablet Take 1 tablet by mouth Daily. 90 tablet 3    nitroglycerin (NITROSTAT) 0.4 MG SL tablet Place 1 tablet under the tongue Every 5 (Five) Minutes As Needed for Chest Pain (x3). Take no more than 3 doses in 15 minutes. 30 tablet 1    nortriptyline (PAMELOR) 10 MG capsule Take 1 capsule by  "mouth Every Night.      pantoprazole (PROTONIX) 40 MG EC tablet TAKE ONE TABLET BY MOUTH DAILY 90 tablet 2    pravastatin (PRAVACHOL) 20 MG tablet Take 1 tablet by mouth Daily. 90 tablet 3    traMADol (ULTRAM) 50 MG tablet Take 1 tablet by mouth 2 (Two) Times a Day.      simethicone (Gas-X) 80 MG chewable tablet Chew 1 tablet Every 6 (Six) Hours As Needed for Flatulence. 90 tablet 3     No current facility-administered medications for this visit.       Patient has no known allergies.    Past Medical History:   Diagnosis Date    Abnormal PFT 2009    Mild restrictive ventilatory defect and diffusion capacity mildly enlarged    Chest x-ray abnormality 2009    Small (R) pleural effusion    Fibromyalgia     GERD (gastroesophageal reflux disease)     H/O eye surgery 2008    Cataracts removed L eye.     H/O: hysterectomy     History of cholecystectomy     Hypertension     Pericardial effusion     mild    RA (rheumatoid arthritis)     Vasculitis     Vasculitis        Social History     Socioeconomic History    Marital status:     Number of children: 3   Tobacco Use    Smoking status: Former     Years: 5     Types: Cigarettes     Quit date:      Years since quittin.1    Smokeless tobacco: Never    Tobacco comments:     pt  states that she only smoked a few cigarettes a day off an on for a few years   Vaping Use    Vaping Use: Never used   Substance and Sexual Activity    Alcohol use: No    Drug use: No    Sexual activity: Defer       Family History   Problem Relation Age of Onset    Heart attack Sister        Vitals:   /64 (BP Location: Left arm)   Pulse 64   Ht 157.5 cm (62.01\")   Wt 51.7 kg (114 lb)   BMI 20.85 kg/m²     Physical Exam  Vitals and nursing note reviewed.   Neck:      Vascular: No carotid bruit.   Cardiovascular:      Rate and Rhythm: Normal rate and regular rhythm.      Pulses: Normal pulses.      Heart sounds: Murmur heard.       with a grade of 2/6.      No " friction rub. No gallop.   Pulmonary:      Effort: Pulmonary effort is normal.      Breath sounds: Normal breath sounds and air entry.   Musculoskeletal:      Right lower leg: No edema.      Left lower leg: No edema.   Skin:     General: Skin is warm and dry.      Capillary Refill: Capillary refill takes less than 2 seconds.   Neurological:      Mental Status: She is alert and oriented to person, place, and time.       Procedures     Assessment & Plan     Diagnoses and all orders for this visit:    1. Dizziness (Primary)  -     US Carotid Bilateral; Future    2. Primary hypertension  -     carvedilol (COREG) 6.25 MG tablet; Take 1 tablet by mouth 2 (Two) Times a Day.  Dispense: 180 tablet; Refill: 3  -     losartan (COZAAR) 100 MG tablet; Take 1 tablet by mouth Daily.  Dispense: 90 tablet; Refill: 3  -     NIFEdipine XL (PROCARDIA XL) 30 MG 24 hr tablet; Take 1 tablet by mouth Daily.  Dispense: 90 tablet; Refill: 3    3. Stenosis of right carotid artery  -     US Carotid Bilateral; Future    4. Hypercholesteremia  -     pravastatin (PRAVACHOL) 20 MG tablet; Take 1 tablet by mouth Daily.  Dispense: 90 tablet; Refill: 3    5. PVD (peripheral vascular disease) with claudication    Other orders  -     clopidogrel (PLAVIX) 75 MG tablet; Take 1 tablet by mouth Daily.  Dispense: 90 tablet; Refill: 3  -     furosemide (LASIX) 20 MG tablet; Take 1 tablet by mouth Daily.  Dispense: 90 tablet; Refill: 3  -     cilostazol (PLETAL) 50 MG tablet; Take 1 tablet by mouth 2 (Two) Times a Day.  Dispense: 180 tablet; Refill: 3  -     simethicone (Gas-X) 80 MG chewable tablet; Chew 1 tablet Every 6 (Six) Hours As Needed for Flatulence.  Dispense: 90 tablet; Refill: 3    Dizziness/stenosis right carotid artery/headache  - Order bilateral carotid ultrasound to compare with last ultrasound  - Encouraged hydration    HTN  - BP elevated today she states it is not that high at home normally  - Continue current medications Coreg, losartan,  nifedipine    Hypercholesteremia  - Labs managed with PCP unavailable for review today  - Continue statin    Start simethicone to see if it relieves chest pressure which she attributes to gas pain. Levi carotid US for HA and dizziness    Visit Diagnoses:    ICD-10-CM ICD-9-CM   1. Dizziness  R42 780.4   2. Primary hypertension  I10 401.9   3. Stenosis of right carotid artery  I65.21 433.10   4. Hypercholesteremia  E78.00 272.0   5. PVD (peripheral vascular disease) with claudication  I73.9 443.9       Meds Ordered During Visit:  New Medications Ordered This Visit   Medications    carvedilol (COREG) 6.25 MG tablet     Sig: Take 1 tablet by mouth 2 (Two) Times a Day.     Dispense:  180 tablet     Refill:  3    clopidogrel (PLAVIX) 75 MG tablet     Sig: Take 1 tablet by mouth Daily.     Dispense:  90 tablet     Refill:  3    furosemide (LASIX) 20 MG tablet     Sig: Take 1 tablet by mouth Daily.     Dispense:  90 tablet     Refill:  3    losartan (COZAAR) 100 MG tablet     Sig: Take 1 tablet by mouth Daily.     Dispense:  90 tablet     Refill:  3    NIFEdipine XL (PROCARDIA XL) 30 MG 24 hr tablet     Sig: Take 1 tablet by mouth Daily.     Dispense:  90 tablet     Refill:  3    pravastatin (PRAVACHOL) 20 MG tablet     Sig: Take 1 tablet by mouth Daily.     Dispense:  90 tablet     Refill:  3    cilostazol (PLETAL) 50 MG tablet     Sig: Take 1 tablet by mouth 2 (Two) Times a Day.     Dispense:  180 tablet     Refill:  3    simethicone (Gas-X) 80 MG chewable tablet     Sig: Chew 1 tablet Every 6 (Six) Hours As Needed for Flatulence.     Dispense:  90 tablet     Refill:  3       Follow Up Appointment:   Return in about 6 months (around 8/1/2024), or if symptoms worsen or fail to improve.           This document has been electronically signed by EVELYN Clark  February 2, 2024 11:41 EST    Dictated Utilizing Dragon Dictation: Part of this note may be an electronic transcription/translation of spoken language to printed  text using the Dragon Dictation System.

## 2024-02-08 ENCOUNTER — HOSPITAL ENCOUNTER (OUTPATIENT)
Dept: CARDIOLOGY | Facility: HOSPITAL | Age: 89
Discharge: HOME | End: 2024-02-08
Admitting: NURSE PRACTITIONER
Payer: MEDICARE

## 2024-02-08 DIAGNOSIS — I65.21 STENOSIS OF RIGHT CAROTID ARTERY: ICD-10-CM

## 2024-02-08 DIAGNOSIS — R42 DIZZINESS: ICD-10-CM

## 2024-02-08 PROCEDURE — 93880 EXTRACRANIAL BILAT STUDY: CPT | Performed by: RADIOLOGY

## 2024-02-08 PROCEDURE — 93880 EXTRACRANIAL BILAT STUDY: CPT

## 2024-08-08 ENCOUNTER — OFFICE VISIT (OUTPATIENT)
Dept: CARDIOLOGY | Facility: CLINIC | Age: 89
End: 2024-08-08
Payer: MEDICARE

## 2024-08-08 VITALS
BODY MASS INDEX: 19.98 KG/M2 | WEIGHT: 108.6 LBS | HEIGHT: 62 IN | SYSTOLIC BLOOD PRESSURE: 106 MMHG | DIASTOLIC BLOOD PRESSURE: 56 MMHG | HEART RATE: 64 BPM

## 2024-08-08 DIAGNOSIS — R07.9 CHEST PAIN, UNSPECIFIED TYPE: ICD-10-CM

## 2024-08-08 DIAGNOSIS — R06.02 SHORTNESS OF BREATH: Primary | ICD-10-CM

## 2024-08-08 DIAGNOSIS — I10 PRIMARY HYPERTENSION: ICD-10-CM

## 2024-08-08 RX ORDER — LOSARTAN POTASSIUM 50 MG/1
50 TABLET ORAL DAILY
Qty: 90 TABLET | Refills: 3 | Status: SHIPPED | OUTPATIENT
Start: 2024-08-08

## 2024-08-08 NOTE — PROGRESS NOTES
Chief Complaint   Patient presents with    Follow-up     Cardiac management    Lab     Last labs per PCP in December, see chart. She reports having stents placed in bilateral lower extremities with Dr Herr.    Neck Pain     Having increased pain in left side of neck for the last 2-3 weeks.     Chest Pain     Having dull pain in mid to right chest lasting a few minutes. Notices daily and more at night. She does becomes short with the pain.    Med Refill     Does not need refills at this time.        HPI:  FAVIOLA Pena is a 89 y.o. female with RA, SVT s/p ablation, PVD, HTN, dyslipidemia and carotid stenosis. She underwent angioplasty and stenting to both legs by Dr. Phillips in 2014. In 2016, HCTZ stopped due to renal function. Coreg increased. Blood pressure was not at goal and Procardia added. In February 2019, Pletal added with positive response. Carotid US 2/2020 for dizziness and showed 50% MADI and 50-69% LICA. One year fu on 8/12/21 showed no change. Echo also remained stable.      She underwent biopsy temporal artery outpatient per Dr. Frausto on 11/23//21. After the procedure, she c/o chest pain radiating to left shoulder found to have bp 190/90. She was admitted to [facility]. EKG and troponin neg. Pain resolved with nitro patch. Lexiscan negative for ischemia. Carotid US reported as no flow on the right and <50% on the left. Suboptimal study suggested. She followed with Dr. Flores for PAD who recommended conservative management. For persistent bilateral leg pain, she was referred to Dr. Herr, underwent intervention with significant improvement. She is feeling very well. Labs followed by Dr. Grace and Dr. Lafleur.      She returns today for follow up.  She had carotid ultrasound 2/8/2024 which showed moderate plaque within both carotids 50 to 69% stenosis. She is reporting daily chest pain that is described as dull with accompanied SOB. Patient denies chest pressure, palpitations, tightness, dizziness, TIA  symptoms.     Cardiac History:    Past Surgical History:   Procedure Laterality Date    ANGIOPLASTY  03/11/2014    (R) L/E Angioplasty with stent placement (Dr. Phillips)    ANGIOPLASTY  02/21/2014    (L) Lower Extremity Angioplasty with stent placement (Dr. Phillips)    CARDIOVASCULAR STRESS TEST  03/31/2003    D. Myoview-(Atropine) 90% THR. Negative    CARDIOVASCULAR STRESS TEST  06/10/2009    Stress 2 min 22 sec. 68% THR. Negative    CARDIOVASCULAR STRESS TEST  08/30/2010    P Myoview- Negative    CARDIOVASCULAR STRESS TEST  09/24/2012    Stress- (Mod) 3 min 32 sec. 89% THR. 182/62. Negative    CARDIOVASCULAR STRESS TEST  12/17/2014    Lexiscan- ([facility])- EF 65%, negative for ischemia    CARDIOVASCULAR STRESS TEST  11/24/2021    ([facility]) Lexiscan- EF >70%, no ishcemia    CONVERTED (HISTORICAL) HOLTER  12/10/2007    Holter- 58 BPM. One 3 beat SVT ( Dr. Isbell) Multiple artifact    CONVERTED (HISTORICAL) HOLTER  07/09/2013    Holter- AVG HR 68 BPM    CT ANGIO LWR EXTREMITY UNILATERAL W WO CONTRAST  07/26/2013    Severe Bilateral Distal Disease    ECHO - CONVERTED  04/17/2008    Echo- EF 60%, mod MR, small rim of a pericardial effusion    ECHO - CONVERTED  06/10/2009    Echo- EF >60%. moderate AR. RVSP- 42 mmHg    ECHO - CONVERTED  09/24/2012    Echo- EF 65-70%, RVSP 47 mmHg    ECHO - CONVERTED  12/17/2014    Echo- ([facility]) EF 60%, mild MR, mild pericardial effusion    ECHO - CONVERTED  02/01/2017    EF > 65%,mod MR, RVSP 35-40 mmHg    ECHO - CONVERTED  02/24/2020    EF 70%. Mild MR. RVSP- 44 mmHg. Small Pericardial Effusion    ECHO - CONVERTED  09/21/2021    EF > 70%. Mild MR. RVSP- m49 mmHg. Small PE    ECHO - CONVERTED  11/24/2021    ([facility]) EF 65%, LVH, sm pericardial eff, RVSP 51 mmHg    EP STUDY  07/28/2010    EP study with ablation of SVT. (Dr. Cochran)    OTHER SURGICAL HISTORY  07/12/2014    WOO- 0.8 & 0.9    US CAROTID UNILATERAL  07/21/2015    Carotid- Possible 50-69% stenosis on the right. CTA recommended, creatinine  of 1.4    US CAROTID UNILATERAL  02/24/2020    Moderate stenosis bilaterally     US CAROTID UNILATERAL  08/12/2021    Levi:  Moderate bilateral stenosis    US CAROTID UNILATERAL  11/24/2021    No flow to MADI, <50% LICA (suboptimal study)       Current Outpatient Medications   Medication Sig Dispense Refill    ALPRAZolam (XANAX) 0.5 MG tablet Take 1 tablet by mouth 2 (Two) Times a Day.      aspirin 81 MG EC tablet Take 1 tablet by mouth Daily.      carvedilol (COREG) 6.25 MG tablet Take 1 tablet by mouth 2 (Two) Times a Day. 180 tablet 3    cilostazol (PLETAL) 50 MG tablet Take 1 tablet by mouth 2 (Two) Times a Day. 180 tablet 3    clopidogrel (PLAVIX) 75 MG tablet Take 1 tablet by mouth Daily. 90 tablet 3    furosemide (LASIX) 20 MG tablet Take 1 tablet by mouth Daily. 90 tablet 3    gabapentin (NEURONTIN) 300 MG capsule Take 1 capsule by mouth 3 (Three) Times a Day.      latanoprost (XALATAN) 0.005 % ophthalmic solution 1 drop Every Night.      losartan (COZAAR) 50 MG tablet Take 1 tablet by mouth Daily. 90 tablet 3    methotrexate 2.5 MG tablet Take 1 tablet by mouth 1 (One) Time Per Week. Take 4 tablets daily on Thursday      NIFEdipine XL (PROCARDIA XL) 30 MG 24 hr tablet Take 1 tablet by mouth Daily. 90 tablet 3    nitroglycerin (NITROSTAT) 0.4 MG SL tablet Place 1 tablet under the tongue Every 5 (Five) Minutes As Needed for Chest Pain (x3). Take no more than 3 doses in 15 minutes. 30 tablet 1    pantoprazole (PROTONIX) 40 MG EC tablet TAKE ONE TABLET BY MOUTH DAILY 90 tablet 2    pravastatin (PRAVACHOL) 20 MG tablet Take 1 tablet by mouth Daily. 90 tablet 3    simethicone (Gas-X) 80 MG chewable tablet Chew 1 tablet Every 6 (Six) Hours As Needed for Flatulence. 90 tablet 3    traMADol (ULTRAM) 50 MG tablet Take 1 tablet by mouth 2 (Two) Times a Day.       No current facility-administered medications for this visit.       Patient has no known allergies.    Past Medical History:   Diagnosis Date    Abnormal PFT  "2009    Mild restrictive ventilatory defect and diffusion capacity mildly enlarged    Chest x-ray abnormality 2009    Small (R) pleural effusion    Fibromyalgia     GERD (gastroesophageal reflux disease)     H/O eye surgery 2008    Cataracts removed L eye.     H/O: hysterectomy     History of cholecystectomy     Hypertension     Pericardial effusion     mild    RA (rheumatoid arthritis)     Vasculitis     Vasculitis        Social History     Socioeconomic History    Marital status:     Number of children: 3   Tobacco Use    Smoking status: Former     Current packs/day: 0.00     Types: Cigarettes     Start date:      Quit date:      Years since quittin.6     Passive exposure: Past    Smokeless tobacco: Never    Tobacco comments:     pt  states that she only smoked a few cigarettes a day off an on for a few years   Vaping Use    Vaping status: Never Used   Substance and Sexual Activity    Alcohol use: No    Drug use: No    Sexual activity: Defer       Family History   Problem Relation Age of Onset    Heart attack Sister        Vitals:   /56 (BP Location: Left arm)   Pulse 64   Ht 157.5 cm (62.01\")   Wt 49.3 kg (108 lb 9.6 oz)   BMI 19.86 kg/m²     Physical Exam  Vitals and nursing note reviewed.   Neck:      Vascular: Carotid bruit present.   Cardiovascular:      Rate and Rhythm: Normal rate and regular rhythm.      Pulses: Normal pulses.      Heart sounds: Murmur heard.      No friction rub. No gallop.   Pulmonary:      Effort: Pulmonary effort is normal.      Breath sounds: Normal breath sounds and air entry.   Musculoskeletal:      Right lower leg: No edema.      Left lower leg: No edema.   Skin:     General: Skin is warm and dry.      Capillary Refill: Capillary refill takes less than 2 seconds.   Neurological:      Mental Status: She is alert and oriented to person, place, and time.       Procedures     Assessment & Plan     Diagnoses and all orders for this " visit:    1. Shortness of breath (Primary)  -     Adult Transthoracic Echo Complete W/ Cont if Necessary Per Protocol; Future  -     Stress Test With Myocardial Perfusion One Day; Future    2. Primary hypertension  -     losartan (COZAAR) 50 MG tablet; Take 1 tablet by mouth Daily.  Dispense: 90 tablet; Refill: 3    3. Chest pain, unspecified type  -     Adult Transthoracic Echo Complete W/ Cont if Necessary Per Protocol; Future  -     Stress Test With Myocardial Perfusion One Day; Future    SOB/chest pain  - Recommend stress and echo to evaluate EF, for ischemia, valvular structure and function, PAP    Hypertension  - BP lower than desired  - Decrease losartan to 50 mg daily  - Continue carvedilol, nifedipine at current dose    Decrease losartan to 50 mg for low BP. Repeat carotid US at next visit to monitor bilateral carotid stenosis    Visit Diagnoses:    ICD-10-CM ICD-9-CM   1. Shortness of breath  R06.02 786.05   2. Primary hypertension  I10 401.9   3. Chest pain, unspecified type  R07.9 786.50       Meds Ordered During Visit:  New Medications Ordered This Visit   Medications    losartan (COZAAR) 50 MG tablet     Sig: Take 1 tablet by mouth Daily.     Dispense:  90 tablet     Refill:  3       Follow Up Appointment:   Return in about 6 months (around 2/8/2025), or if symptoms worsen or fail to improve.           This document has been electronically signed by EVELYN Clark  August 14, 2024 14:43 EDT    Dictated Utilizing Dragon Dictation: Part of this note may be an electronic transcription/translation of spoken language to printed text using the Dragon Dictation System.

## 2024-08-20 NOTE — TELEPHONE ENCOUNTER
Cardiac clearance letter sent.   
OK. 5 Days  
Received fax from Dr. Almaguer for cardiac clearance for patient to have an EGD and/or colonoscopy. Patient is on Plavix and they are requesting to hold. According to our records, I do not see where patient has had any stenting.         Fax [phone]  
red

## 2024-09-10 ENCOUNTER — HOSPITAL ENCOUNTER (OUTPATIENT)
Dept: CARDIOLOGY | Facility: HOSPITAL | Age: 89
Discharge: HOME | End: 2024-09-10
Payer: MEDICARE

## 2024-09-10 VITALS — HEIGHT: 62 IN | BODY MASS INDEX: 20 KG/M2 | WEIGHT: 108.69 LBS

## 2024-09-10 DIAGNOSIS — R07.9 CHEST PAIN, UNSPECIFIED TYPE: ICD-10-CM

## 2024-09-10 DIAGNOSIS — R06.02 SHORTNESS OF BREATH: ICD-10-CM

## 2024-09-10 LAB
AORTIC DIMENSIONLESS INDEX: 0.84 (DI)
BH CV ECHO MEAS - ACS: 1.56 CM
BH CV ECHO MEAS - AO MAX PG: 8.1 MMHG
BH CV ECHO MEAS - AO MEAN PG: 4.1 MMHG
BH CV ECHO MEAS - AO ROOT DIAM: 2.9 CM
BH CV ECHO MEAS - AO V2 MAX: 142 CM/SEC
BH CV ECHO MEAS - AO V2 VTI: 28.3 CM
BH CV ECHO MEAS - EDV(CUBED): 25.4 ML
BH CV ECHO MEAS - ESV(CUBED): 5.5 ML
BH CV ECHO MEAS - FS: 39.9 %
BH CV ECHO MEAS - IVS/LVPW: 1.04 CM
BH CV ECHO MEAS - IVSD: 1.39 CM
BH CV ECHO MEAS - LA DIMENSION: 2.6 CM
BH CV ECHO MEAS - LAT PEAK E' VEL: 4.2 CM/SEC
BH CV ECHO MEAS - LV MASS(C)D: 131.5 GRAMS
BH CV ECHO MEAS - LV MAX PG: 4.5 MMHG
BH CV ECHO MEAS - LV MEAN PG: 1.91 MMHG
BH CV ECHO MEAS - LV V1 MAX: 105.5 CM/SEC
BH CV ECHO MEAS - LV V1 VTI: 23.8 CM
BH CV ECHO MEAS - LVIDD: 2.9 CM
BH CV ECHO MEAS - LVIDS: 1.77 CM
BH CV ECHO MEAS - LVPWD: 1.34 CM
BH CV ECHO MEAS - MED PEAK E' VEL: 3.2 CM/SEC
BH CV ECHO MEAS - MV A MAX VEL: 143.3 CM/SEC
BH CV ECHO MEAS - MV DEC SLOPE: 322.5 CM/SEC2
BH CV ECHO MEAS - MV DEC TIME: 0.31 SEC
BH CV ECHO MEAS - MV E MAX VEL: 79.8 CM/SEC
BH CV ECHO MEAS - MV E/A: 0.56
BH CV ECHO MEAS - MV MAX PG: 10.9 MMHG
BH CV ECHO MEAS - MV MEAN PG: 3.3 MMHG
BH CV ECHO MEAS - MV P1/2T: 79.8 MSEC
BH CV ECHO MEAS - MV V2 VTI: 35.8 CM
BH CV ECHO MEAS - MVA(P1/2T): 2.8 CM2
BH CV ECHO MEAS - PA V2 MAX: 119.8 CM/SEC
BH CV ECHO MEAS - PI END-D VEL: 122.7 CM/SEC
BH CV ECHO MEAS - RAP SYSTOLE: 8 MMHG
BH CV ECHO MEAS - RV MAX PG: 2.5 MMHG
BH CV ECHO MEAS - RV V1 MAX: 79.6 CM/SEC
BH CV ECHO MEAS - RV V1 VTI: 17.8 CM
BH CV ECHO MEAS - RVDD: 2.5 CM
BH CV ECHO MEAS - RVSP: 43.4 MMHG
BH CV ECHO MEAS - TAPSE (>1.6): 2.01 CM
BH CV ECHO MEAS - TR MAX PG: 35.4 MMHG
BH CV ECHO MEAS - TR MAX VEL: 297.5 CM/SEC
BH CV ECHO MEASUREMENTS AVERAGE E/E' RATIO: 21.57
BH CV REST NUCLEAR ISOTOPE DOSE: 10 MCI
BH CV STRESS COMMENTS STAGE 1: NORMAL
BH CV STRESS DOSE REGADENOSON STAGE 1: 0.4
BH CV STRESS DURATION MIN STAGE 1: 0
BH CV STRESS DURATION SEC STAGE 1: 10
BH CV STRESS NUCLEAR ISOTOPE DOSE: 30 MCI
BH CV STRESS PROTOCOL 1: NORMAL
BH CV STRESS RECOVERY BP: NORMAL MMHG
BH CV STRESS RECOVERY HR: 81 BPM
BH CV STRESS STAGE 1: 1
BH CV XLRA - TDI S': 14.6 CM/SEC
LV EF NUC BP: 71 %
MAXIMAL PREDICTED HEART RATE: 129 BPM
PERCENT MAX PREDICTED HR: 71.32 %
SINUS: 3 CM
STRESS BASELINE BP: NORMAL MMHG
STRESS BASELINE HR: 74 BPM
STRESS PERCENT HR: 84 %
STRESS POST PEAK BP: NORMAL MMHG
STRESS POST PEAK HR: 92 BPM
STRESS TARGET HR: 110 BPM

## 2024-09-10 PROCEDURE — 0 TECHNETIUM SESTAMIBI: Performed by: INTERNAL MEDICINE

## 2024-09-10 PROCEDURE — 93306 TTE W/DOPPLER COMPLETE: CPT | Performed by: INTERNAL MEDICINE

## 2024-09-10 PROCEDURE — 25010000002 REGADENOSON 0.4 MG/5ML SOLUTION: Performed by: INTERNAL MEDICINE

## 2024-09-10 PROCEDURE — 78452 HT MUSCLE IMAGE SPECT MULT: CPT

## 2024-09-10 PROCEDURE — 93017 CV STRESS TEST TRACING ONLY: CPT

## 2024-09-10 PROCEDURE — 78452 HT MUSCLE IMAGE SPECT MULT: CPT | Performed by: INTERNAL MEDICINE

## 2024-09-10 PROCEDURE — 93306 TTE W/DOPPLER COMPLETE: CPT

## 2024-09-10 PROCEDURE — 93018 CV STRESS TEST I&R ONLY: CPT | Performed by: INTERNAL MEDICINE

## 2024-09-10 PROCEDURE — A9500 TC99M SESTAMIBI: HCPCS | Performed by: INTERNAL MEDICINE

## 2024-09-10 RX ORDER — REGADENOSON 0.08 MG/ML
0.4 INJECTION, SOLUTION INTRAVENOUS
Status: COMPLETED | OUTPATIENT
Start: 2024-09-10 | End: 2024-09-10

## 2024-09-10 RX ADMIN — TECHNETIUM TC 99M SESTAMIBI 1 DOSE: 1 INJECTION INTRAVENOUS at 14:05

## 2024-09-10 RX ADMIN — TECHNETIUM TC 99M SESTAMIBI 1 DOSE: 1 INJECTION INTRAVENOUS at 12:02

## 2024-09-10 RX ADMIN — REGADENOSON 0.4 MG: 0.08 INJECTION, SOLUTION INTRAVENOUS at 14:05

## 2024-09-11 DIAGNOSIS — I10 PRIMARY HYPERTENSION: ICD-10-CM

## 2024-09-12 DIAGNOSIS — E78.00 HYPERCHOLESTEREMIA: ICD-10-CM

## 2024-10-30 RX ORDER — PRAVASTATIN SODIUM 20 MG
20 TABLET ORAL DAILY
Qty: 90 TABLET | Refills: 3 | Status: SHIPPED | OUTPATIENT
Start: 2024-10-30

## 2024-10-30 RX ORDER — FUROSEMIDE 20 MG/1
20 TABLET ORAL DAILY
Qty: 90 TABLET | Refills: 3 | Status: SHIPPED | OUTPATIENT
Start: 2024-10-30

## 2024-10-30 NOTE — TELEPHONE ENCOUNTER
Rx Refill Note  Requested Prescriptions     Pending Prescriptions Disp Refills    pravastatin (PRAVACHOL) 20 MG tablet [Pharmacy Med Name: PRAVASTATIN SODIUM 20 MG TAB] 90 tablet 3     Sig: TAKE 1 TABLET BY MOUTH DAILY    furosemide (LASIX) 20 MG tablet [Pharmacy Med Name: FUROSEMIDE 20 MG TABLET] 90 tablet 3     Sig: TAKE 1 TABLET BY MOUTH DAILY      Last office visit with prescribing clinician: 7/24/2023   Last telemedicine visit with prescribing clinician: Visit date not found   Next office visit with prescribing clinician: 2/17/2025                         Would you like a call back once the refill request has been completed: [] Yes [] No    If the office needs to give you a call back, can they leave a voicemail: [] Yes [] No    Patti Karimi CMA  10/30/24, 11:08 EDT

## 2025-01-20 ENCOUNTER — CLINICAL SUPPORT (OUTPATIENT)
Dept: CARDIOLOGY | Facility: CLINIC | Age: OVER 89
End: 2025-01-20
Payer: MEDICARE

## 2025-01-20 ENCOUNTER — TELEPHONE (OUTPATIENT)
Dept: CARDIOLOGY | Facility: CLINIC | Age: OVER 89
End: 2025-01-20

## 2025-01-20 VITALS — DIASTOLIC BLOOD PRESSURE: 90 MMHG | SYSTOLIC BLOOD PRESSURE: 158 MMHG | HEART RATE: 60 BPM

## 2025-01-20 DIAGNOSIS — I10 PRIMARY HYPERTENSION: Primary | ICD-10-CM

## 2025-01-20 DIAGNOSIS — Z01.30 BLOOD PRESSURE CHECK: Primary | ICD-10-CM

## 2025-01-20 RX ORDER — FOLIC ACID 1 MG/1
1 TABLET ORAL DAILY
COMMUNITY

## 2025-01-20 RX ORDER — NIFEDIPINE 30 MG/1
30 TABLET, EXTENDED RELEASE ORAL DAILY
Qty: 90 TABLET | Refills: 2 | Status: SHIPPED | OUTPATIENT
Start: 2025-01-20

## 2025-01-20 RX ORDER — NIFEDIPINE 30 MG
30 TABLET, EXTENDED RELEASE ORAL DAILY
Status: DISCONTINUED | OUTPATIENT
Start: 2025-01-20 | End: 2025-01-20

## 2025-01-20 NOTE — TELEPHONE ENCOUNTER
It is hard for me to make a determination since she had not taken any medication today.  But based on reports we will see how she does on the 30 mg.  I sent that new prescription to the pharmacy of 30 mg.  Her stress test that she just had a few months ago was negative for ischemia.  Lets see how she does going back to the 30 mg of nifedipine

## 2025-01-20 NOTE — TELEPHONE ENCOUNTER
Caller: Cindi Hensley    Relationship: Emergency Contact    Best call back number: [phone]    Which medication are you concerned about: NIFEDIPINE 60MG    Who prescribed you this medication: JAEL LOBO    When did you start taking this medication: DOSAGE INCREASE WAS ISSUED AROUND SEPTEMBER 2024    What are your concerns: PT TOOK HER MEDICATION LAST NIGHT AND FELT HER BP DROP, PT'S DAUGHTER IS NOT SURE IF PT TOOK HER BP OR NOT, NO INDICATION OF WHAT THE READINGS WERE.     PT'S DAUGHTER IS WANTING HER MOTHER TO COME IN AND GET CHECKED OUT ABOUT ALL THIS MEDICATION SHE IS TAKING.     How long have you had these concerns: WAS YESTERDAY, COULD BE A LOT LONGER, PT'S DAUGHTER IS UNAWARE.

## 2025-01-20 NOTE — TELEPHONE ENCOUNTER
Patient came into office and her blood pressure was 158/90 and heart rate 60. Patient relayed that they were taking Nifedipine 30 mg and not the 60 mg tablets. Had started the 60 mg when running out of the 30 and that was when the issue of low blood pressure arose. Patient had not taken any medications today. Relays that they have been having some chest tightness in chest that occurs on and off and some swelling in feet. See's Dr. Herr Vascular surgeon.

## 2025-01-20 NOTE — TELEPHONE ENCOUNTER
Spoke with daughter Cindi concerning BP and medications. Patient had one episode yesterday after taking Nifedipine 60 mg that it felt like her BP was low. She has been receiving injections for her back recently, yet feels like her BP is dropping. Patient is scheduled for BP check this am. Daughter aware of BP check.

## 2025-01-20 NOTE — TELEPHONE ENCOUNTER
Patient daughter would like medication be sent to [facility] with note that patient will call for medication when needed. Patient will be cutting her 60 mg tablets in half.

## 2025-02-17 ENCOUNTER — OFFICE VISIT (OUTPATIENT)
Dept: CARDIOLOGY | Facility: CLINIC | Age: OVER 89
End: 2025-02-17
Payer: MEDICARE

## 2025-02-17 VITALS
WEIGHT: 107 LBS | DIASTOLIC BLOOD PRESSURE: 58 MMHG | BODY MASS INDEX: 19.69 KG/M2 | HEART RATE: 68 BPM | HEIGHT: 62 IN | SYSTOLIC BLOOD PRESSURE: 120 MMHG

## 2025-02-17 DIAGNOSIS — I10 PRIMARY HYPERTENSION: ICD-10-CM

## 2025-02-17 DIAGNOSIS — I65.21 STENOSIS OF RIGHT CAROTID ARTERY: Primary | ICD-10-CM

## 2025-02-17 DIAGNOSIS — E78.00 HYPERCHOLESTEREMIA: ICD-10-CM

## 2025-02-17 DIAGNOSIS — I34.0 NON-RHEUMATIC MITRAL REGURGITATION: ICD-10-CM

## 2025-02-17 PROCEDURE — 1159F MED LIST DOCD IN RCRD: CPT | Performed by: NURSE PRACTITIONER

## 2025-02-17 PROCEDURE — 99214 OFFICE O/P EST MOD 30 MIN: CPT | Performed by: NURSE PRACTITIONER

## 2025-02-17 PROCEDURE — 1160F RVW MEDS BY RX/DR IN RCRD: CPT | Performed by: NURSE PRACTITIONER

## 2025-02-17 RX ORDER — NIFEDIPINE 30 MG/1
30 TABLET, EXTENDED RELEASE ORAL DAILY
Qty: 90 TABLET | Refills: 2 | Status: SHIPPED | OUTPATIENT
Start: 2025-02-17

## 2025-02-17 RX ORDER — LOSARTAN POTASSIUM 50 MG/1
50 TABLET ORAL DAILY
Qty: 90 TABLET | Refills: 3 | Status: SHIPPED | OUTPATIENT
Start: 2025-02-17

## 2025-02-17 RX ORDER — CLOPIDOGREL BISULFATE 75 MG/1
75 TABLET ORAL DAILY
Qty: 90 TABLET | Refills: 3 | Status: SHIPPED | OUTPATIENT
Start: 2025-02-17

## 2025-02-17 RX ORDER — PREDNISONE 2.5 MG/1
2.5 TABLET ORAL DAILY
COMMUNITY

## 2025-02-17 RX ORDER — CILOSTAZOL 50 MG/1
50 TABLET ORAL 2 TIMES DAILY
Qty: 180 TABLET | Refills: 3 | Status: SHIPPED | OUTPATIENT
Start: 2025-02-17

## 2025-02-17 RX ORDER — CARVEDILOL 6.25 MG/1
6.25 TABLET ORAL 2 TIMES DAILY
Qty: 180 TABLET | Refills: 3 | Status: SHIPPED | OUTPATIENT
Start: 2025-02-17

## 2025-02-17 RX ORDER — FUROSEMIDE 20 MG/1
20 TABLET ORAL DAILY
Qty: 90 TABLET | Refills: 3 | Status: SHIPPED | OUTPATIENT
Start: 2025-02-17

## 2025-02-17 RX ORDER — PRAVASTATIN SODIUM 20 MG
20 TABLET ORAL DAILY
Qty: 90 TABLET | Refills: 3 | Status: SHIPPED | OUTPATIENT
Start: 2025-02-17

## 2025-02-17 NOTE — PROGRESS NOTES
"Chief Complaint   Patient presents with    Follow-up     Cardiac management    Lab     Last labs per PCP in November, see chart. Reports she had more labs with Dr Lafleur a week ago.    Edema     Having swelling in legs, ankles and feet.    Shortness of Breath     Notices more at night when she lays down, states \"at times I have to get up at night and turn the air on\".    Neck pain     Having pain in left side of neck, following with Dr Herr. Had US of neck a couple weeks ago, to get results on Monday.    Blood pressure     Has had elevated BP, reports Nifedipine is helping lower BP.    Med Refill     Needs refills on cardiac medications-90 day. Patient did not bring medication list, went over medications verbally.     Thierry Pena is a 89 y.o. female with RA, SVT s/p ablation, PVD, HTN, dyslipidemia and carotid stenosis. She underwent angioplasty and stenting to both legs by Dr. Phillips in 2014. In 2016, HCTZ stopped due to renal function. Coreg increased. Blood pressure was not at goal and Procardia added. In February 2019, Pletal added with positive response.      She underwent biopsy temporal artery outpatient per Dr. Frausto on 11/23//21. After the procedure, she c/o chest pain radiating to left shoulder found to have bp 190/90. She was admitted to [facility].  Workup negative.  She followed with Dr. Flores for PAD who recommended conservative management. For persistent bilateral leg pain, she was referred to Dr. Herr, underwent intervention with significant improvement.  Lexiscan stress and echo repeated 9/2024, no ischemia, normal LVEF.  Baseline HTN at 217/91.  Procardia increased to 60 mg.      She returns today for follow up.  She had increasing leg edema with higher dose calcium channel blocker, reduced back to 30 mg.  So far, blood pressure has been well-controlled.  She denies chest pain.  Has significant arthritis pain, low back pain.  Taking prednisone 2.5 mg daily per Dr. Lafleur.  Arterial " US LE and carotid US last month with Dr. Herr.  Was advised leg swelling from venous insufficiency.  Plans to follow-up next week for carotid results.  No TIA, no syncope, no recent fall.  Labs per Dr. Her. 11/7/2024: H/H11.3/37.2, platelets 292, GFR 32, K5.2, .         Cardiac History:    Past Surgical History:   Procedure Laterality Date    ANGIOPLASTY  03/11/2014    (R) L/E Angioplasty with stent placement (Dr. Phillips)    ANGIOPLASTY  02/21/2014    (L) Lower Extremity Angioplasty with stent placement (Dr. Phillips)    CARDIOVASCULAR STRESS TEST  03/31/2003    D. Myoview-(Atropine) 90% THR. Negative    CARDIOVASCULAR STRESS TEST  06/10/2009    Stress 2 min 22 sec. 68% THR. Negative    CARDIOVASCULAR STRESS TEST  08/30/2010    P Myoview- Negative    CARDIOVASCULAR STRESS TEST  09/24/2012    Stress- (Mod) 3 min 32 sec. 89% THR. 182/62. Negative    CARDIOVASCULAR STRESS TEST  12/17/2014    Lexiscan- ([facility])- EF 65%, negative for ischemia    CARDIOVASCULAR STRESS TEST  11/24/2021    ([facility]) Lexiscan- EF >70%, no ishcemia    CARDIOVASCULAR STRESS TEST  09/10/2024    Lexiscan- EF > 70%. 217/91. Breast attenuation    CONVERTED (HISTORICAL) HOLTER  12/10/2007    Holter- 58 BPM. One 3 beat SVT ( Dr. Isbell) Multiple artifact    CONVERTED (HISTORICAL) HOLTER  07/09/2013    Holter- AVG HR 68 BPM    CT ANGIO LWR EXTREMITY UNILATERAL W WO CONTRAST  07/26/2013    Severe Bilateral Distal Disease    ECHO - CONVERTED  04/17/2008    Echo- EF 60%, mod MR, small rim of a pericardial effusion    ECHO - CONVERTED  06/10/2009    Echo- EF >60%. moderate AR. RVSP- 42 mmHg    ECHO - CONVERTED  09/24/2012    Echo- EF 65-70%, RVSP 47 mmHg    ECHO - CONVERTED  12/17/2014    Echo- ([facility]) EF 60%, mild MR, mild pericardial effusion    ECHO - CONVERTED  02/01/2017    EF > 65%,mod MR, RVSP 35-40 mmHg    ECHO - CONVERTED  02/24/2020    EF 70%. Mild MR. RVSP- 44 mmHg. Small Pericardial Effusion    ECHO - CONVERTED  09/21/2021    EF > 70%. Mild  MR. RVSP- m49 mmHg. Small PE    ECHO - CONVERTED  11/24/2021    ([facility]) EF 65%, LVH, sm pericardial eff, RVSP 51 mmHg    ECHO - CONVERTED  09/10/2024    EF 65%.HCM. Mild-Mod MR. RVSP- 43 mmHg. Small PE    EP STUDY  07/28/2010    EP study with ablation of SVT. (Dr. Cochran)    OTHER SURGICAL HISTORY  07/12/2014    WOO- 0.8 & 0.9    US CAROTID UNILATERAL  07/21/2015    Carotid- Possible 50-69% stenosis on the right. CTA recommended, creatinine of 1.4    US CAROTID UNILATERAL  02/24/2020    Moderate stenosis bilaterally     US CAROTID UNILATERAL  08/12/2021    Levi:  Moderate bilateral stenosis    US CAROTID UNILATERAL  11/24/2021    No flow to MADI, <50% LICA (suboptimal study)     Current Outpatient Medications   Medication Sig Dispense Refill    ALPRAZolam (XANAX) 0.5 MG tablet Take 1 tablet by mouth 2 (Two) Times a Day.      aspirin 81 MG EC tablet Take 1 tablet by mouth Daily.      carvedilol (COREG) 6.25 MG tablet Take 1 tablet by mouth 2 (Two) Times a Day. 180 tablet 3    cilostazol (PLETAL) 50 MG tablet Take 1 tablet by mouth 2 (Two) Times a Day. 180 tablet 3    clopidogrel (PLAVIX) 75 MG tablet Take 1 tablet by mouth Daily. 90 tablet 3    FIBER COMPLETE PO Take  by mouth.      folic acid (FOLVITE) 1 MG tablet Take 1 tablet by mouth Daily.      furosemide (LASIX) 20 MG tablet Take 1 tablet by mouth Daily. 90 tablet 3    gabapentin (NEURONTIN) 300 MG capsule Take 1 capsule by mouth 3 (Three) Times a Day.      latanoprost (XALATAN) 0.005 % ophthalmic solution 1 drop Every Night.      losartan (COZAAR) 50 MG tablet Take 1 tablet by mouth Daily. 90 tablet 3    methotrexate 2.5 MG tablet Take 1 tablet by mouth 1 (One) Time Per Week. Take 4 tablets daily on Thursday      NIFEdipine XL (PROCARDIA XL) 30 MG 24 hr tablet Take 1 tablet by mouth Daily. 90 tablet 2    nitroglycerin (NITROSTAT) 0.4 MG SL tablet Place 1 tablet under the tongue Every 5 (Five) Minutes As Needed for Chest Pain (x3). Take no more than 3 doses in 15  minutes. 30 tablet 1    pantoprazole (PROTONIX) 40 MG EC tablet TAKE ONE TABLET BY MOUTH DAILY 90 tablet 2    pravastatin (PRAVACHOL) 20 MG tablet Take 1 tablet by mouth Daily. 90 tablet 3    predniSONE (DELTASONE) 2.5 MG tablet Take 1 tablet by mouth Daily.      simethicone (Gas-X) 80 MG chewable tablet Chew 1 tablet Every 6 (Six) Hours As Needed for Flatulence. 90 tablet 3    traMADol (ULTRAM) 50 MG tablet Take 1 tablet by mouth 2 (Two) Times a Day.       No current facility-administered medications for this visit.     Patient has no known allergies.    Past Medical History:   Diagnosis Date    Abnormal PFT 2009    Mild restrictive ventilatory defect and diffusion capacity mildly enlarged    Chest x-ray abnormality 2009    Small (R) pleural effusion    Fibromyalgia     GERD (gastroesophageal reflux disease)     H/O eye surgery 2008    Cataracts removed L eye.     H/O: hysterectomy     History of cholecystectomy     Hypertension     Pericardial effusion     mild    RA (rheumatoid arthritis)     Vasculitis     Vasculitis      Social History     Socioeconomic History    Marital status:     Number of children: 3   Tobacco Use    Smoking status: Former     Current packs/day: 0.00     Types: Cigarettes     Start date:      Quit date:      Years since quittin.1     Passive exposure: Past    Smokeless tobacco: Never    Tobacco comments:     pt  states that she only smoked a few cigarettes a day off an on for a few years   Vaping Use    Vaping status: Never Used   Substance and Sexual Activity    Alcohol use: No    Drug use: No    Sexual activity: Defer     Family History   Problem Relation Age of Onset    Heart attack Sister      Review of Systems   Constitutional: Negative for decreased appetite and malaise/fatigue.   HENT: Negative.     Eyes:  Negative for blurred vision.   Cardiovascular:  Negative for chest pain, dyspnea on exertion, leg swelling, palpitations and syncope.  "  Respiratory:  Negative for shortness of breath and sleep disturbances due to breathing.    Endocrine: Negative.    Hematologic/Lymphatic: Negative for bleeding problem. Does not bruise/bleed easily.   Skin: Negative.    Musculoskeletal:  Positive for arthritis and back pain. Negative for falls and myalgias.   Gastrointestinal:  Negative for abdominal pain, heartburn and melena.   Genitourinary:  Negative for hematuria.   Neurological:  Negative for dizziness and light-headedness.   Psychiatric/Behavioral:  Negative for altered mental status.    Allergic/Immunologic: Negative.       Objective     /58 (BP Location: Left arm, Patient Position: Sitting)   Pulse 68   Ht 157.5 cm (62.01\")   Wt 48.5 kg (107 lb)   BMI 19.57 kg/m²     Vitals and nursing note reviewed.   Constitutional:       General: Not in acute distress.     Appearance: Well-developed. Not diaphoretic.   Eyes:      Pupils: Pupils are equal, round, and reactive to light.   HENT:      Head: Normocephalic.   Neck:      Vascular: Carotid bruit (Left) present.   Pulmonary:      Effort: Pulmonary effort is normal. No respiratory distress.      Breath sounds: Normal breath sounds.   Cardiovascular:      Normal rate. Regular rhythm.      Murmurs: There is a systolic murmur.   Pulses:     Intact distal pulses.   Edema:     Peripheral edema absent.   Abdominal:      General: Bowel sounds are normal.      Palpations: Abdomen is soft.   Musculoskeletal: Normal range of motion.      Cervical back: Normal range of motion. Skin:     General: Skin is warm and dry.   Neurological:      Mental Status: Alert and oriented to person, place, and time.       Procedures          Problem List Items Addressed This Visit          Cardiac and Vasculature    HTN (hypertension)    Relevant Medications    carvedilol (COREG) 6.25 MG tablet    losartan (COZAAR) 50 MG tablet    NIFEdipine XL (PROCARDIA XL) 30 MG 24 hr tablet    furosemide (LASIX) 20 MG tablet    Carotid " stenosis - Primary    Hypercholesteremia    Relevant Medications    pravastatin (PRAVACHOL) 20 MG tablet    Non-rheumatic mitral regurgitation    Relevant Medications    carvedilol (COREG) 6.25 MG tablet    NIFEdipine XL (PROCARDIA XL) 30 MG 24 hr tablet    cilostazol (PLETAL) 50 MG tablet    clopidogrel (PLAVIX) 75 MG tablet      PAD/edema  -Extensive peripheral disease, s/p intervention with Dr. Herr  -Pedal pulses palpable, feet are warm and pink  -She does have 1+ pretibial edema, encourage compression and elevation  -Likely secondary to venous insufficiency, calcium channel blocker  -Continue low-dose Lasix    Carotid stenosis  -No TIA  -Carotid ultrasound to 8/20/2024, LICA 1.5, R ICA 2.4, moderate  -Carotid ultrasound repeated with Dr. Herr's office, results pending  -Continue antiplatelets, statin    Hyperlipidemia  -Managed with pravastatin  -Labs followed by PCP    HTN  -Well-controlled at present  -If blood pressure increases to greater than 160/90, advised to take as needed dose of nifedipine 30 mg  -Continue carvedilol, losartan, Procardia XL 30 mg daily, Lasix 20 mg daily    Reviewed stress test and echocardiogram.  Cardiac status is stable.  Follow-up 6 months or sooner if needed.    BMI is within normal parameters. No other follow-up for BMI required.                   Electronically signed by EVELYN Lira,  February 17, 2025 12:55 EST

## 2025-03-19 ENCOUNTER — TELEPHONE (OUTPATIENT)
Dept: CARDIOLOGY | Facility: CLINIC | Age: OVER 89
End: 2025-03-19
Payer: MEDICARE

## 2025-03-19 NOTE — TELEPHONE ENCOUNTER
Received fax from [facility] for cardiac clearance for patient to have a LESI. Patient is on Plavix and they are requesting to hold for 7 days prior to procedure and they are requesting to hold Pletal for 3 days prior to procedure. According to our records, patient had stenting in lower extremities in 2014.          Fax [phone]

## 2025-04-14 DIAGNOSIS — I10 PRIMARY HYPERTENSION: ICD-10-CM

## 2025-04-14 RX ORDER — LOSARTAN POTASSIUM 100 MG/1
100 TABLET ORAL DAILY
Qty: 90 TABLET | Refills: 3 | OUTPATIENT
Start: 2025-04-14

## 2025-04-25 ENCOUNTER — TELEPHONE (OUTPATIENT)
Dept: CARDIOLOGY | Facility: CLINIC | Age: OVER 89
End: 2025-04-25
Payer: MEDICARE

## 2025-04-25 NOTE — TELEPHONE ENCOUNTER
Received fax from Dr. Almaguer for cardiac clearance for patient to have an EGD and a colonoscopy. Patient is on Plavix and they are requesting to hold. According to our records, I do not see where patient has had any stenting to the coronary arteries, but did have stents placed in left lower extremity on 02/21/14.          Fax [phone]

## 2025-06-24 ENCOUNTER — TELEPHONE (OUTPATIENT)
Dept: CARDIOLOGY | Facility: CLINIC | Age: OVER 89
End: 2025-06-24
Payer: MEDICARE

## 2025-06-24 NOTE — TELEPHONE ENCOUNTER
Caller: Kylah Pena    Relationship: Self    Best call back number: [phone]    What form or medical record are you requesting: MOST RECENT PROGRESS NOTE    Who is requesting this form or medical record from you: [facility]     How would you like to receive the form or medical records (pick-up, mail, fax): FAX  If fax, what is the fax number: [phone]    Timeframe paperwork needed: ASAP    Additional info: ATTEMPT TO WARM TRANSFER DUE TO OUTSIDE PROVIDER REPORTING ACTIVE SYMPTOMS AND REQUESTING FOLLOW UP. SCHEDULED FOR 7.1.25 AT 830AM. PLEASE ADVISE.

## 2025-07-01 ENCOUNTER — TELEPHONE (OUTPATIENT)
Dept: CARDIOLOGY | Facility: CLINIC | Age: OVER 89
End: 2025-07-01

## 2025-07-01 NOTE — TELEPHONE ENCOUNTER
Tried to call patient but went to voicemail.     If patient calls back Charissa Lee has NO availability [facility]. If patient is willing to see a different provider Melody Murcia has some openings that patient can have. If patient refuses to see anyone besides Charissa she can call as often as she likes to see if cancellations are available.

## 2025-07-01 NOTE — TELEPHONE ENCOUNTER
DELETE AFTER REVIEWING: Send this encounter to the appropriate pool. See your Call Action Grid or Workflows for direction.    Caller: Kylah Pena    Relationship to patient: Self    Best call back number:   Telephone Information:   Mobile [phone]       Chief complaint: PER OUTSIDE PROVIDER DUE TO SOB, CHEST PAIN, CONGESTIVE HEART FAILURE SYMPTOMS     Type of visit: FOLLOW UP    Requested date: NEXT AVAILABLE     If rescheduling, when is the original appointment: 7.1.25     Additional notes:PATIENTS RHEUMATOOLOGY PROVIDER CALLED TO RESCHEDULE APPT THAT WAS MISSED TODAY 7.1.25. NO AVAILABILITY BEFORE SEPTEMBER. PLEASE CHARLEEN PATIENT DIRECTLY WITH EARLIER APPT. ATTEMPTED TO WARM TRANSFER DUE TO PROVIDER REQUESTING NEXT DAY APPT, UNSUCCESSFUL.

## 2025-08-19 ENCOUNTER — OFFICE VISIT (OUTPATIENT)
Dept: CARDIOLOGY | Facility: CLINIC | Age: OVER 89
End: 2025-08-19
Payer: MEDICARE

## 2025-08-19 VITALS
WEIGHT: 95.4 LBS | BODY MASS INDEX: 17.55 KG/M2 | HEIGHT: 62 IN | DIASTOLIC BLOOD PRESSURE: 60 MMHG | HEART RATE: 68 BPM | SYSTOLIC BLOOD PRESSURE: 122 MMHG

## 2025-08-19 DIAGNOSIS — E55.9 VITAMIN D DEFICIENCY: ICD-10-CM

## 2025-08-19 DIAGNOSIS — E78.00 HYPERCHOLESTEREMIA: ICD-10-CM

## 2025-08-19 DIAGNOSIS — I73.9 PVD (PERIPHERAL VASCULAR DISEASE) WITH CLAUDICATION: ICD-10-CM

## 2025-08-19 DIAGNOSIS — R60.0 BILATERAL LEG EDEMA: ICD-10-CM

## 2025-08-19 DIAGNOSIS — R06.02 SHORTNESS OF BREATH: ICD-10-CM

## 2025-08-19 DIAGNOSIS — I34.0 NON-RHEUMATIC MITRAL REGURGITATION: ICD-10-CM

## 2025-08-19 DIAGNOSIS — I10 PRIMARY HYPERTENSION: Primary | ICD-10-CM

## 2025-08-19 PROCEDURE — 1159F MED LIST DOCD IN RCRD: CPT | Performed by: NURSE PRACTITIONER

## 2025-08-19 PROCEDURE — 1160F RVW MEDS BY RX/DR IN RCRD: CPT | Performed by: NURSE PRACTITIONER

## 2025-08-19 PROCEDURE — 99214 OFFICE O/P EST MOD 30 MIN: CPT | Performed by: NURSE PRACTITIONER

## 2025-08-19 RX ORDER — PRAVASTATIN SODIUM 20 MG
20 TABLET ORAL DAILY
Qty: 90 TABLET | Refills: 3 | Status: SHIPPED | OUTPATIENT
Start: 2025-08-19

## 2025-08-19 RX ORDER — CILOSTAZOL 50 MG/1
50 TABLET ORAL 2 TIMES DAILY
Qty: 180 TABLET | Refills: 3 | Status: SHIPPED | OUTPATIENT
Start: 2025-08-19

## 2025-08-19 RX ORDER — CARVEDILOL 6.25 MG/1
6.25 TABLET ORAL 2 TIMES DAILY
Qty: 180 TABLET | Refills: 3 | Status: SHIPPED | OUTPATIENT
Start: 2025-08-19

## 2025-08-19 RX ORDER — LOSARTAN POTASSIUM 50 MG/1
50 TABLET ORAL DAILY
Qty: 90 TABLET | Refills: 3 | Status: SHIPPED | OUTPATIENT
Start: 2025-08-19

## 2025-08-19 RX ORDER — NIFEDIPINE 30 MG/1
30 TABLET, EXTENDED RELEASE ORAL DAILY
Qty: 90 TABLET | Refills: 2 | Status: SHIPPED | OUTPATIENT
Start: 2025-08-19

## 2025-08-19 RX ORDER — FUROSEMIDE 20 MG/1
20 TABLET ORAL DAILY
Qty: 90 TABLET | Refills: 3 | Status: SHIPPED | OUTPATIENT
Start: 2025-08-19

## 2025-08-19 RX ORDER — CLOPIDOGREL BISULFATE 75 MG/1
75 TABLET ORAL DAILY
Qty: 90 TABLET | Refills: 3 | Status: SHIPPED | OUTPATIENT
Start: 2025-08-19

## 2025-08-27 ENCOUNTER — LAB (OUTPATIENT)
Dept: LAB | Facility: HOSPITAL | Age: OVER 89
End: 2025-08-27
Payer: MEDICARE

## 2025-08-27 ENCOUNTER — HOSPITAL ENCOUNTER (OUTPATIENT)
Dept: CARDIOLOGY | Facility: HOSPITAL | Age: OVER 89
Discharge: HOME | End: 2025-08-27
Payer: MEDICARE

## 2025-08-27 VITALS — HEIGHT: 62 IN | WEIGHT: 95.46 LBS | BODY MASS INDEX: 17.57 KG/M2

## 2025-08-27 DIAGNOSIS — R06.02 SHORTNESS OF BREATH: ICD-10-CM

## 2025-08-27 DIAGNOSIS — E78.00 HYPERCHOLESTEREMIA: ICD-10-CM

## 2025-08-27 DIAGNOSIS — I73.9 PVD (PERIPHERAL VASCULAR DISEASE) WITH CLAUDICATION: ICD-10-CM

## 2025-08-27 DIAGNOSIS — R60.0 BILATERAL LEG EDEMA: ICD-10-CM

## 2025-08-27 DIAGNOSIS — I34.0 NON-RHEUMATIC MITRAL REGURGITATION: ICD-10-CM

## 2025-08-27 DIAGNOSIS — I10 PRIMARY HYPERTENSION: ICD-10-CM

## 2025-08-27 DIAGNOSIS — E55.9 VITAMIN D DEFICIENCY: ICD-10-CM

## 2025-08-27 LAB
ALBUMIN SERPL-MCNC: 4.5 G/DL (ref 3.5–5.2)
ALBUMIN/GLOB SERPL: 1.6 G/DL
ALP SERPL-CCNC: 91 U/L (ref 39–117)
ALT SERPL W P-5'-P-CCNC: 12 U/L (ref 1–33)
ANION GAP SERPL CALCULATED.3IONS-SCNC: 10.4 MMOL/L (ref 5–15)
AORTIC DIMENSIONLESS INDEX: 0.85 (DI)
AST SERPL-CCNC: 24 U/L (ref 1–32)
AV MEAN PRESS GRAD SYS DOP V1V2: 5.6 MMHG
AV VMAX SYS DOP: 182.6 CM/SEC
BH CV ECHO MEAS - ACS: 1.17 CM
BH CV ECHO MEAS - AO MAX PG: 13.3 MMHG
BH CV ECHO MEAS - AO ROOT DIAM: 2.9 CM
BH CV ECHO MEAS - AO V2 VTI: 32.1 CM
BH CV ECHO MEAS - EDV(CUBED): 20 ML
BH CV ECHO MEAS - EF(MOD-SP2): 71 %
BH CV ECHO MEAS - EF(MOD-SP4): 67 %
BH CV ECHO MEAS - ESV(CUBED): 2.7 ML
BH CV ECHO MEAS - FS: 48.8 %
BH CV ECHO MEAS - IVS/LVPW: 1.17 CM
BH CV ECHO MEAS - IVSD: 1.53 CM
BH CV ECHO MEAS - LA DIMENSION: 2.8 CM
BH CV ECHO MEAS - LAT PEAK E' VEL: 4.9 CM/SEC
BH CV ECHO MEAS - LV MASS(C)D: 126.5 GRAMS
BH CV ECHO MEAS - LV MAX PG: 6.2 MMHG
BH CV ECHO MEAS - LV MEAN PG: 3.1 MMHG
BH CV ECHO MEAS - LV V1 MAX: 125 CM/SEC
BH CV ECHO MEAS - LV V1 VTI: 27.4 CM
BH CV ECHO MEAS - LVIDD: 2.7 CM
BH CV ECHO MEAS - LVIDS: 1.39 CM
BH CV ECHO MEAS - LVPWD: 1.31 CM
BH CV ECHO MEAS - MED PEAK E' VEL: 4.6 CM/SEC
BH CV ECHO MEAS - MV A MAX VEL: 132.4 CM/SEC
BH CV ECHO MEAS - MV DEC SLOPE: 318.1 CM/SEC2
BH CV ECHO MEAS - MV DEC TIME: 0.41 SEC
BH CV ECHO MEAS - MV E MAX VEL: 83.3 CM/SEC
BH CV ECHO MEAS - MV E/A: 0.63
BH CV ECHO MEAS - MV MAX PG: 9 MMHG
BH CV ECHO MEAS - MV MEAN PG: 3.5 MMHG
BH CV ECHO MEAS - MV P1/2T: 82.7 MSEC
BH CV ECHO MEAS - MV V2 VTI: 39.6 CM
BH CV ECHO MEAS - MVA(P1/2T): 2.7 CM2
BH CV ECHO MEAS - PA V2 MAX: 123.6 CM/SEC
BH CV ECHO MEAS - PI END-D VEL: 112.1 CM/SEC
BH CV ECHO MEAS - RAP SYSTOLE: 3 MMHG
BH CV ECHO MEAS - RV MAX PG: 1.4 MMHG
BH CV ECHO MEAS - RV V1 MAX: 59.2 CM/SEC
BH CV ECHO MEAS - RV V1 VTI: 13.8 CM
BH CV ECHO MEAS - RVSP: 40.7 MMHG
BH CV ECHO MEAS - TAPSE (>1.6): 3 CM
BH CV ECHO MEAS - TR MAX PG: 37.7 MMHG
BH CV ECHO MEAS - TR MAX VEL: 307.2 CM/SEC
BH CV ECHO MEASUREMENTS AVERAGE E/E' RATIO: 17.54
BH CV XLRA - TDI S': 14.9 CM/SEC
BILIRUB SERPL-MCNC: 0.4 MG/DL (ref 0–1.2)
BUN SERPL-MCNC: 34.9 MG/DL (ref 8–23)
BUN/CREAT SERPL: 30.3 (ref 7–25)
CALCIUM SPEC-SCNC: 10 MG/DL (ref 8.2–9.6)
CHLORIDE SERPL-SCNC: 102 MMOL/L (ref 98–107)
CO2 SERPL-SCNC: 29.6 MMOL/L (ref 22–29)
CREAT SERPL-MCNC: 1.15 MG/DL (ref 0.57–1)
D DIMER PPP FEU-MCNC: 1.04 MCGFEU/ML (ref 0–0.92)
DEPRECATED RDW RBC AUTO: 55.4 FL (ref 37–54)
EGFRCR SERPLBLD CKD-EPI 2021: 44.8 ML/MIN/1.73
ERYTHROCYTE [DISTWIDTH] IN BLOOD BY AUTOMATED COUNT: 15.2 % (ref 12.3–15.4)
ERYTHROCYTE [SEDIMENTATION RATE] IN BLOOD: 10 MM/HR (ref 0–30)
GLOBULIN UR ELPH-MCNC: 2.8 GM/DL
GLUCOSE SERPL-MCNC: 93 MG/DL (ref 65–99)
HCT VFR BLD AUTO: 45.5 % (ref 34–46.6)
HGB BLD-MCNC: 13.8 G/DL (ref 12–15.9)
LV EF 2D ECHO EST: 82 %
LV EF BIPLANE MOD: 68 %
MAGNESIUM SERPL-MCNC: 2.3 MG/DL (ref 1.7–2.3)
MCH RBC QN AUTO: 29.8 PG (ref 26.6–33)
MCHC RBC AUTO-ENTMCNC: 30.3 G/DL (ref 31.5–35.7)
MCV RBC AUTO: 98.3 FL (ref 79–97)
NT-PROBNP SERPL-MCNC: 2050 PG/ML (ref 0–1800)
PLATELET # BLD AUTO: 256 10*3/MM3 (ref 140–450)
PMV BLD AUTO: 8.9 FL (ref 6–12)
POTASSIUM SERPL-SCNC: 5.6 MMOL/L (ref 3.5–5.2)
PROT SERPL-MCNC: 7.3 G/DL (ref 6–8.5)
RBC # BLD AUTO: 4.63 10*6/MM3 (ref 3.77–5.28)
SINUS: 2.8 CM
SODIUM SERPL-SCNC: 142 MMOL/L (ref 136–145)
TSH SERPL DL<=0.05 MIU/L-ACNC: 2.11 UIU/ML (ref 0.27–4.2)
WBC NRBC COR # BLD AUTO: 8.16 10*3/MM3 (ref 3.4–10.8)

## 2025-08-27 PROCEDURE — 85379 FIBRIN DEGRADATION QUANT: CPT

## 2025-08-27 PROCEDURE — 83880 ASSAY OF NATRIURETIC PEPTIDE: CPT

## 2025-08-27 PROCEDURE — 83735 ASSAY OF MAGNESIUM: CPT

## 2025-08-27 PROCEDURE — 82306 VITAMIN D 25 HYDROXY: CPT

## 2025-08-27 PROCEDURE — 36415 COLL VENOUS BLD VENIPUNCTURE: CPT

## 2025-08-27 PROCEDURE — 85652 RBC SED RATE AUTOMATED: CPT

## 2025-08-27 PROCEDURE — 84443 ASSAY THYROID STIM HORMONE: CPT

## 2025-08-27 PROCEDURE — 82607 VITAMIN B-12: CPT

## 2025-08-27 PROCEDURE — 85027 COMPLETE CBC AUTOMATED: CPT

## 2025-08-27 PROCEDURE — 80053 COMPREHEN METABOLIC PANEL: CPT

## 2025-08-27 PROCEDURE — 93306 TTE W/DOPPLER COMPLETE: CPT

## 2025-08-28 LAB
25(OH)D3 SERPL-MCNC: 27.5 NG/ML (ref 30–100)
VIT B12 BLD-MCNC: 546 PG/ML (ref 211–946)